# Patient Record
Sex: FEMALE | Race: WHITE | NOT HISPANIC OR LATINO | Employment: UNEMPLOYED | URBAN - METROPOLITAN AREA
[De-identification: names, ages, dates, MRNs, and addresses within clinical notes are randomized per-mention and may not be internally consistent; named-entity substitution may affect disease eponyms.]

---

## 2017-02-01 ENCOUNTER — OFFICE VISIT (OUTPATIENT)
Dept: FAMILY MEDICINE CLINIC | Facility: CLINIC | Age: 77
End: 2017-02-01

## 2017-02-01 VITALS
HEART RATE: 87 BPM | OXYGEN SATURATION: 99 % | SYSTOLIC BLOOD PRESSURE: 130 MMHG | DIASTOLIC BLOOD PRESSURE: 60 MMHG | HEIGHT: 65 IN | WEIGHT: 245 LBS | BODY MASS INDEX: 40.82 KG/M2

## 2017-02-01 DIAGNOSIS — N64.89 BREAST HEMATOMA: Primary | ICD-10-CM

## 2017-02-01 PROCEDURE — 99213 OFFICE O/P EST LOW 20 MIN: CPT | Performed by: FAMILY MEDICINE

## 2017-02-01 RX ORDER — ATORVASTATIN CALCIUM 10 MG/1
10 TABLET, FILM COATED ORAL DAILY
COMMUNITY

## 2017-02-01 RX ORDER — FEBUXOSTAT 80 MG/1
40 TABLET, FILM COATED ORAL DAILY
COMMUNITY

## 2017-02-01 RX ORDER — CARVEDILOL 3.12 MG/1
3.12 TABLET ORAL DAILY
COMMUNITY
End: 2017-05-04 | Stop reason: ALTCHOICE
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
## 2017-02-17 ENCOUNTER — OFFICE VISIT (OUTPATIENT)
Dept: MAMMOGRAPHY | Facility: CLINIC | Age: 77
End: 2017-02-17

## 2017-02-17 VITALS
TEMPERATURE: 97.5 F | HEART RATE: 64 BPM | WEIGHT: 241 LBS | SYSTOLIC BLOOD PRESSURE: 128 MMHG | BODY MASS INDEX: 38.73 KG/M2 | OXYGEN SATURATION: 97 % | DIASTOLIC BLOOD PRESSURE: 58 MMHG | HEIGHT: 66 IN

## 2017-02-17 DIAGNOSIS — N63.0 LUMP OR MASS IN BREAST: Primary | ICD-10-CM

## 2017-02-17 PROCEDURE — 99204 OFFICE O/P NEW MOD 45 MIN: CPT | Performed by: SURGERY
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
## 2017-04-24 DIAGNOSIS — Z79.899 HIGH RISK MEDICATION USE: ICD-10-CM

## 2017-04-24 DIAGNOSIS — R53.83 NO ENERGY: ICD-10-CM

## 2017-04-24 DIAGNOSIS — E11.9 DIABETES MELLITUS, STABLE (HCC): ICD-10-CM

## 2017-04-24 DIAGNOSIS — E78.2 MIXED HYPERLIPIDEMIA: Primary | ICD-10-CM

## 2017-04-27 LAB
ALBUMIN SERPL-MCNC: 4.3 G/DL (ref 3.5–5.2)
ALBUMIN/GLOB SERPL: 1.7 G/DL
ALP SERPL-CCNC: 84 U/L (ref 39–117)
ALT SERPL-CCNC: 13 U/L (ref 1–33)
AST SERPL-CCNC: 14 U/L (ref 1–32)
BILIRUB SERPL-MCNC: 0.5 MG/DL (ref 0.1–1.2)
BUN SERPL-MCNC: 88 MG/DL (ref 8–23)
BUN/CREAT SERPL: 47.1 (ref 7–25)
CALCIUM SERPL-MCNC: 9.4 MG/DL (ref 8.6–10.5)
CHLORIDE SERPL-SCNC: 100 MMOL/L (ref 98–107)
CHOLEST SERPL-MCNC: 123 MG/DL (ref 0–200)
CO2 SERPL-SCNC: 26.8 MMOL/L (ref 22–29)
CREAT SERPL-MCNC: 1.87 MG/DL (ref 0.57–1)
ERYTHROCYTE [DISTWIDTH] IN BLOOD BY AUTOMATED COUNT: 16.3 % (ref 11.7–13)
GLOBULIN SER CALC-MCNC: 2.6 GM/DL
GLUCOSE SERPL-MCNC: 126 MG/DL (ref 65–99)
HCT VFR BLD AUTO: 31.7 % (ref 35.6–45.5)
HDLC SERPL-MCNC: 36 MG/DL (ref 40–60)
HGB BLD-MCNC: 9.7 G/DL (ref 11.9–15.5)
LDLC SERPL CALC-MCNC: 57 MG/DL (ref 0–100)
LDLC/HDLC SERPL: 1.57 {RATIO}
MCH RBC QN AUTO: 27.3 PG (ref 26.9–32)
MCHC RBC AUTO-ENTMCNC: 30.6 G/DL (ref 32.4–36.3)
MCV RBC AUTO: 89.3 FL (ref 80.5–98.2)
PLATELET # BLD AUTO: 166 10*3/MM3 (ref 140–500)
POTASSIUM SERPL-SCNC: 4.6 MMOL/L (ref 3.5–5.2)
PROT SERPL-MCNC: 6.9 G/DL (ref 6–8.5)
RBC # BLD AUTO: 3.55 10*6/MM3 (ref 3.9–5.2)
SODIUM SERPL-SCNC: 142 MMOL/L (ref 136–145)
TRIGL SERPL-MCNC: 152 MG/DL (ref 0–150)
VLDLC SERPL CALC-MCNC: 30.4 MG/DL (ref 5–40)
WBC # BLD AUTO: 8.93 10*3/MM3 (ref 4.5–10.7)

## 2017-05-01 DIAGNOSIS — D50.9 IRON DEFICIENCY ANEMIA, UNSPECIFIED: Primary | ICD-10-CM

## 2017-05-01 LAB
BASOPHILS # BLD AUTO: 0.03 10*3/MM3 (ref 0–0.2)
BASOPHILS NFR BLD AUTO: 0.4 % (ref 0–1.5)
EOSINOPHIL # BLD AUTO: 0.31 10*3/MM3 (ref 0–0.7)
EOSINOPHIL NFR BLD AUTO: 3.8 % (ref 0.3–6.2)
ERYTHROCYTE [DISTWIDTH] IN BLOOD BY AUTOMATED COUNT: 16.4 % (ref 11.7–13)
FOLATE SERPL-MCNC: >20 NG/ML (ref 4.78–24.2)
HCT VFR BLD AUTO: 31 % (ref 35.6–45.5)
HGB BLD-MCNC: 9.4 G/DL (ref 11.9–15.5)
IMM GRANULOCYTES # BLD: 0.04 10*3/MM3 (ref 0–0.03)
IMM GRANULOCYTES NFR BLD: 0.5 % (ref 0–0.5)
IRON SATN MFR SERPL: 11 % (ref 20–50)
IRON SERPL-MCNC: 39 MCG/DL (ref 37–145)
LYMPHOCYTES # BLD AUTO: 1.78 10*3/MM3 (ref 0.9–4.8)
LYMPHOCYTES NFR BLD AUTO: 21.8 % (ref 19.6–45.3)
MCH RBC QN AUTO: 26.7 PG (ref 26.9–32)
MCHC RBC AUTO-ENTMCNC: 30.3 G/DL (ref 32.4–36.3)
MCV RBC AUTO: 88.1 FL (ref 80.5–98.2)
MONOCYTES # BLD AUTO: 0.51 10*3/MM3 (ref 0.2–1.2)
MONOCYTES NFR BLD AUTO: 6.3 % (ref 5–12)
NEUTROPHILS # BLD AUTO: 5.49 10*3/MM3 (ref 1.9–8.1)
NEUTROPHILS NFR BLD AUTO: 67.2 % (ref 42.7–76)
PLATELET # BLD AUTO: 142 10*3/MM3 (ref 140–500)
RBC # BLD AUTO: 3.52 10*6/MM3 (ref 3.9–5.2)
TIBC SERPL-MCNC: 355 MCG/DL
UIBC SERPL-MCNC: 316 MCG/DL
VIT B12 SERPL-MCNC: 426 PG/ML (ref 211–946)
WBC # BLD AUTO: 8.16 10*3/MM3 (ref 4.5–10.7)

## 2017-05-03 ENCOUNTER — CLINICAL SUPPORT (OUTPATIENT)
Dept: FAMILY MEDICINE CLINIC | Facility: CLINIC | Age: 77
End: 2017-05-03

## 2017-05-03 DIAGNOSIS — Z12.11 COLON CANCER SCREENING: Primary | ICD-10-CM

## 2017-05-03 LAB — METHYLMALONATE SERPL-SCNC: 686 NMOL/L (ref 0–378)

## 2017-05-03 PROCEDURE — 82274 ASSAY TEST FOR BLOOD FECAL: CPT | Performed by: FAMILY MEDICINE

## 2017-05-04 ENCOUNTER — OFFICE VISIT (OUTPATIENT)
Dept: FAMILY MEDICINE CLINIC | Facility: CLINIC | Age: 77
End: 2017-05-04

## 2017-05-04 VITALS
DIASTOLIC BLOOD PRESSURE: 48 MMHG | OXYGEN SATURATION: 97 % | WEIGHT: 240 LBS | HEART RATE: 84 BPM | RESPIRATION RATE: 16 BRPM | SYSTOLIC BLOOD PRESSURE: 124 MMHG | HEIGHT: 65 IN | BODY MASS INDEX: 39.99 KG/M2

## 2017-05-04 DIAGNOSIS — D50.8 OTHER IRON DEFICIENCY ANEMIA: Primary | ICD-10-CM

## 2017-05-04 DIAGNOSIS — K92.1: ICD-10-CM

## 2017-05-04 LAB — HEMOCCULT STL QL IA: POSITIVE

## 2017-05-04 PROCEDURE — 99214 OFFICE O/P EST MOD 30 MIN: CPT | Performed by: FAMILY MEDICINE

## 2017-05-04 RX ORDER — PNV NO.95/FERROUS FUM/FOLIC AC 28MG-0.8MG
1 TABLET ORAL DAILY
COMMUNITY

## 2017-05-04 RX ORDER — LANOLIN ALCOHOL/MO/W.PET/CERES
1000 CREAM (GRAM) TOPICAL DAILY
COMMUNITY

## 2017-05-04 RX ORDER — NITROGLYCERIN 0.4 MG/1
0.4 TABLET SUBLINGUAL AS NEEDED
Refills: 5 | COMMUNITY
Start: 2017-03-15

## 2017-05-05 PROBLEM — D64.9 ANEMIA: Status: ACTIVE | Noted: 2017-05-05

## 2017-05-05 PROBLEM — I49.5 SICK SINUS SYNDROME (HCC): Status: ACTIVE | Noted: 2017-05-05

## 2017-05-05 PROBLEM — I35.0 AORTIC VALVE STENOSIS: Status: ACTIVE | Noted: 2017-05-05

## 2017-05-05 PROBLEM — Z95.0 PRESENCE OF CARDIAC PACEMAKER: Status: ACTIVE | Noted: 2017-05-05

## 2017-05-05 PROBLEM — I25.10 CHRONIC CORONARY ARTERY DISEASE: Status: ACTIVE | Noted: 2017-05-05

## 2017-05-05 PROBLEM — I10 BENIGN ESSENTIAL HYPERTENSION: Status: ACTIVE | Noted: 2017-05-05

## 2017-05-08 DIAGNOSIS — K62.5 RECTAL BLEEDING: Primary | ICD-10-CM

## 2017-05-11 ENCOUNTER — TELEPHONE (OUTPATIENT)
Dept: FAMILY MEDICINE CLINIC | Facility: CLINIC | Age: 77
End: 2017-05-11

## 2017-05-18 ENCOUNTER — TELEPHONE (OUTPATIENT)
Dept: FAMILY MEDICINE CLINIC | Facility: CLINIC | Age: 77
End: 2017-05-18

## 2017-06-06 ENCOUNTER — OFFICE (OUTPATIENT)
Dept: URBAN - METROPOLITAN AREA CLINIC 75 | Facility: CLINIC | Age: 77
End: 2017-06-06
Payer: MEDICARE

## 2017-06-06 VITALS
WEIGHT: 243 LBS | HEIGHT: 65 IN | HEIGHT: 65 IN | HEIGHT: 65 IN | SYSTOLIC BLOOD PRESSURE: 126 MMHG | DIASTOLIC BLOOD PRESSURE: 64 MMHG | WEIGHT: 243 LBS | SYSTOLIC BLOOD PRESSURE: 126 MMHG | DIASTOLIC BLOOD PRESSURE: 64 MMHG | WEIGHT: 243 LBS | SYSTOLIC BLOOD PRESSURE: 126 MMHG | DIASTOLIC BLOOD PRESSURE: 64 MMHG

## 2017-06-06 DIAGNOSIS — D50.9 IRON DEFICIENCY ANEMIA, UNSPECIFIED: ICD-10-CM

## 2017-06-06 DIAGNOSIS — Z86.010 PERSONAL HISTORY OF COLONIC POLYPS: ICD-10-CM

## 2017-06-06 PROCEDURE — 99202 OFFICE O/P NEW SF 15 MIN: CPT

## 2017-06-06 PROCEDURE — 99212 OFFICE O/P EST SF 10 MIN: CPT

## 2017-06-13 ENCOUNTER — OFFICE (OUTPATIENT)
Dept: URBAN - METROPOLITAN AREA CLINIC 75 | Facility: CLINIC | Age: 77
End: 2017-06-13

## 2017-06-13 DIAGNOSIS — R19.5 OTHER FECAL ABNORMALITIES: ICD-10-CM

## 2017-06-13 DIAGNOSIS — T18.2XXA FOREIGN BODY IN STOMACH, INITIAL ENCOUNTER: ICD-10-CM

## 2017-06-13 DIAGNOSIS — Z98.890 OTHER SPECIFIED POSTPROCEDURAL STATES: ICD-10-CM

## 2017-06-13 DIAGNOSIS — D64.9 ANEMIA, UNSPECIFIED: ICD-10-CM

## 2017-06-13 PROCEDURE — 91110 GI TRC IMG INTRAL ESOPH-ILE: CPT

## 2021-06-09 ENCOUNTER — OUTSIDE FACILITY SERVICE (OUTPATIENT)
Dept: FAMILY MEDICINE CLINIC | Age: 81
End: 2021-06-09

## 2021-06-09 PROCEDURE — G0439 PPPS, SUBSEQ VISIT: HCPCS | Performed by: FAMILY MEDICINE

## 2021-06-14 NOTE — H&P
Yola Dorman A  1940   Domiciliary, Rest Home (eg, Boarding Home), or correction Care Services  Visit Date: Wed, Jun 9, 2021 11:38 am  Provider: Rosalind Blake MD   Location: [facility]    Subjective:    CC: Ms. Dorman is a 80 year old female.  This is a follow-up visit.  diabetes follow upMWE    HPI:     Yola has diabetes and  she is doing well on victoza,  BS are running <120.  She is working on low carb diet and trying to snack less. Her PN feet is stable and well controlled. She sees optometry for eye exams and retinal injections, she has    macular degeneration    Her gout is well controlled with febuxostat    Chronic afib and sees cardiology and is stable on eliquis and metoprolol, rate controlled, she is stable with her pacemaker    Labs 5/20:  hgb 8.8, HCT 27.9-she sees Dr Burnette and is managed by him      Ms. Dorman is here for a Medicare wellness visit.  The required HRA questions are integrated within this visit note. Family medical history and individual medical/surgical history were reviewed and updated.  A current height, weight, BMI, blood pressure, and pulse were recorded in the vitals section of the note and have been reviewed. Patient's medications, including supplements, were recorded in the chart and reviewed.  Current providers and suppliers were reviewed and updated.      Self-Assessment of Health: She rates her health as good. She rates her confidence of being able to control/manage most of her health problems as somewhat confident. Her physical/emotional health has limited her social activites not at all.  A review of possible cognitive impairment was performed and the following was noted: she is not having trouble driving;  memory changes are noted;  she is not having trouble with her finances A review of functional ability and level of safety was performed and the following was noted: Bathing ( performs independently ); Dressing: ( Performs with  assistance ); Eating: ( performs independently ); Toilet use: ( performs independently ); Transferring: ( performs independently ); Urine and Bowel continence: ( Abnormal ) Falls Risk: Has not had any falls or only one fall without injury in the past year.  She denies having trouble hearing the TV/radio when others do not and having to strain to hear or understand conversations.  Concerning home safety, she reports that at home she DOES have adequate lighting, a skid resistant shower/tub, grab bars in the bath, handrails on stairs, functioning smoke alarms and absence of throw rugs.      Immunization Status: Up to date; Physical Activity: She exercises but less than 20 minutes 3 days per week; Type of diet patient normally eats is described as well-balanced with fruits and vegetables Tobacco: She has never smoked.  Preventative Health updated today She is sleeping well, has some mild bowel incontinence.      PHQ-9 Depression Screening: Completed form scanned and in chart; Total Score 0   ROS:   CONSTITUTIONAL:  Negative for chills, fatigue, fever, and weight change.    EYES:  Negative for blurred vision.    CARDIOVASCULAR:  Positive for pedal edema ( mild ).   Negative for chest pain, dizziness, orthopnea or paroxysmal nocturnal dyspnea.    RESPIRATORY:  Negative for dyspnea.    GASTROINTESTINAL:  Negative for abdominal pain, constipation, diarrhea, hematochezia, melena, nausea and vomiting.    GENITOURINARY:  Negative for dysuria and frequent urination.    INTEGUMENTARY/BREAST:  Negative for rash.    NEUROLOGICAL:  Negative for dizziness, headaches, paresthesias, and weakness.    PSYCHIATRIC:  Negative for anxiety, depression and sleep disturbance.      Past Medical History / Family History / Social History:     Last Reviewed on 6/09/2021 11:41 AM by Rosalind Blake  Past Medical History: D64.9   Anemia, unspecified     N18.3   Chronic kidney disease, stage 3 (moderate)     H35.3230   Exudative age-related  macular degeneration, bilateral, stage unspecified     I25.10   Atherosclerotic heart disease of native coronary artery without angina pectoris     G25.0   Essential tremor     I48.91   Unspecified atrial fibrillation     E11.59   Type 2 diabetes mellitus with other circulatory complications     M10.00   Idiopathic gout, unspecified site     I73.9   Peripheral vascular disease, unspecified     E78.00   Pure hypercholesterolemia, unspecified           PAST MEDICAL HISTORY       CURRENT MEDICAL PROVIDERS:  Cardiologist: Jose Brice  Nephrologist: Dr Bennett  Oncologist: ROSALIE  Ophthalmologist: Dr Crooks, Dr Briones  Podiatry-[facility]  Dentist-unknown     Surgical History:     Coronary Artery Bypass Graft: 4-V 1990's;   Partial foot amputation, diabetic foot wound;     Current Medications: MED RECC SHEET REVIEWED AND SIGNED ON CHART  Last Reviewed on 3/10/2021 11:21 AM by Rosalind Blake  Lanpoornimaus SoloSTAR 100units/1ml Injection [Take 65 units daily]  blood sugar diagnostic strips  [Use strips as directed to test blood sugar daily DX. E11.51]  OneTouch Delica Plus Lancet 33 gauge [USE 1  TO CHECK GLUCOSE ONCE DAILY FOR  FLUCTUATING  BLOOD  SUGARS]    Objective:    Vitals:     Current: 6/9/2021 11:45:19 AM  Wt: 193 lbsT: 96.8 F;  BP: 138/58 mm Hg;  P: 68 bpm;  R: 18 bpmO2 Sat: 99 % (room air)    Exams:   PHYSICAL EXAM:   GENERAL: vital signs recorded - well developed, well nourished,  moderately obese;  well groomed;  no apparent distress;   EYES: nonicteric; PERRL, EOMI   E/N/T: OROPHARYNX:  normal mucosa, dentition, gingiva, and posterior pharynx;   NECK:  supple, full ROM; no thyromegaly; no carotid bruits;   RESPIRATORY: CTA B WITHOUT WHEEZING/RALES OR RHONCHI, NORMAL RESP. EFFORT   CARDIOVASCULAR: normal rate; rhythm is regular;  a systolic murmur is noted: it is grade 3/6 and Character soft;   GASTROINTESTINAL: soft, NT/ND;   BREAST/INTEGUMENT: BREASTS: breast exam is normal without masses, skin changes, or nipple  discharge;   MUSCULOSKELETAL: gait: slowed and wide-based;  no pedal edema;   NEUROLOGIC: mental status: oriented to person, place, and time;  GROSSLY INTACT   PSYCHIATRIC:  appropriate affect and demeanor; normal speech pattern; grossly normal memory;   Left foot exam  Protective sensation using Monofilament test: Decreased, with estimated force of 2 grams applied.  Vascular deficit noted in the dorsal pedal artery  Skin is intact without sores or ulcers  Right foot exam  Protective sensation using Monofilament test: Decreased, with estimated force of 2 grams applied.  Vascular deficit noted in the dorsal pedal artery  Skin is intact without sores or ulcers     Assessment:     Z00.00   Encounter for general adult medical examination without abnormal findings     D64.9   Anemia, unspecified     N18.3   Chronic kidney disease, stage 3 (moderate)     I25.10   Atherosclerotic heart disease of native coronary artery without angina pectoris     G25.0   Essential tremor     I48.91   Unspecified atrial fibrillation     E11.59   Type 2 diabetes mellitus with other circulatory complications     M10.00   Idiopathic gout, unspecified site     I73.9   Peripheral vascular disease, unspecified     E78.00   Pure hypercholesterolemia, unspecified     Z13.31   Encounter for screening for depression     I65.23   Occlusion and stenosis of bilateral carotid arteries     H35.3230   Exudative age-related macular degeneration, bilateral, stage unspecified     G60.0   Hereditary motor and sensory neuropathy       ORDERS:     Procedures Ordered:       Annual wellness visit, includes a PPPS, subsequent visit  (In-House)          Other Orders:     2028F  Foot examination performed (includes examination through visual inspection, sensory exam with monofilament, and pulse exam - report when any of the three components are completed) (DM)4  (In-House)            Depression screen negative  (In-House)          1101F  Pt screen for fall  risk; document no falls in past year or only 1 fall w/o injury in past year (EMILIA)  (In-House)              Plan:     Encounter for general adult medical examination without abnormal findingsMEDICARE WELLNESS EXAM-SHE IS DONE WITH HER MAMMOGRAM, DEXA, COLONOSCOPY, PAP AND PELVIC EXAM, UTD ON VACCINATIONS INCLUDING:  SHINGLES VACCINE/PREVNAR/ PNEUMOVAX/TD/COVID  AND FLU VACCINE, NO FALL RISK, NO MEMORY ISSUES, NO SIGNS/SYMPTOMS OF DEPRESSION, SHE IS NO LONGER ABLE TO DRIVE AND PERFORM ADL'S/FINANCES INDEPENDENTLY, HEARING IS ADEQUATE-PT DEFERS SCREENING TEST, SHE HAS A LIVING WILL AND A PREV SERVICES HANDOUT AND SAFETY HANDOUT WERE GIVEN TO HER.  CURRENT DOCTOR LIST UPDATED.  RECOMMEND YEARLY EYE EXAMS       Orders:       Annual wellness visit, includes a PPPS, subsequent visit  (In-House)          Anemia, unspecifiedshe is on iron infusions and sees Dr Burnette, her colonoscopy 1 year ago was OK    Chronic kidney disease, stage 3 (moderate)stable, has neurology f/u with Dr Bennett    Atherosclerotic heart disease of native coronary artery without angina pectorisno acute or new symptoms    Essential tremorstable, worse at night, overall doing fine    Unspecified atrial fibrillationNSRstable on eliquis and metoprolol,  she is stable with her pacemaker    Type 2 diabetes mellitus with other circulatory complicationswell controlled!    Idiopathic gout, unspecified sitestable on febuxostat    Peripheral vascular disease, unspecifiedstable, foot ulcer resolved    Pure hypercholesterolemia, unspecifiedstable on atorvastatin    Encounter for screening for depression  MIPS PHQ-9 Depression Screening: Completed form scanned and in chart; Total Score 0; Negative Depression Screen       Orders:       Depression screen negative  (In-House)          1101F  Pt screen for fall risk; document no falls in past year or only 1 fall w/o injury in past year (EMILIA)  (In-House)          Occlusion and stenosis of bilateral carotid  [id number]% BLOCKAGE ON CAROTID US B    Exudative age-related macular degeneration, bilateral, stage unspecifiedYoanna has macular degeneration of her eyes, she gets monthly shots.    Hereditary motor and sensory neuropathychronic, stable, she see podiatry, wears surgical shoes due to h/o diabetic foot ulcer      Other Orders    2028F  Foot examination performed (includes examination through visual inspection, sensory exam with monofilament, and pulse exam - report when any of the three components are completed) (DM)4  (In-House)          Charge Capture:     Primary Diagnosis:   Z00.00  Encounter for general adult medical examination without abnormal findings       Orders:    [address]350  Domiciliary/rest home visit for E&M of estab pt, 3 components, mod-high severity; MD visit 40 min  (In-House)            Annual wellness visit, includes a PPPS, subsequent visit  (In-House)          D64.9  Anemia, unspecified   N18.3  Chronic kidney disease, stage 3 (moderate)   I25.10  Atherosclerotic heart disease of native coronary artery without angina pectoris   G25.0  Essential tremor   I48.91  Unspecified atrial fibrillation   E11.59  Type 2 diabetes mellitus with other circulatory complications   M10.00  Idiopathic gout, unspecified site   I73.9  Peripheral vascular disease, unspecified   E78.00  Pure hypercholesterolemia, unspecified   Z13.31  Encounter for screening for depression       Orders:      Depression screen negative  (In-House)          1101F  Pt screen for fall risk; document no falls in past year or only 1 fall w/o injury in past year (EMILIA)  (In-House)          I65.23  Occlusion and stenosis of bilateral carotid arteries   H35.3230  Exudative age-related macular degeneration, bilateral, stage unspecified   G60.0  Hereditary motor and sensory neuropathy     Other Orders:     2028F  Foot examination performed (includes examination through visual inspection, sensory exam with monofilament, and pulse exam -  report when any of the three components are completed) (DM)4  (In-House)

## 2021-08-27 RX ORDER — MELATONIN
Qty: 60 TABLET | Refills: 5 | Status: SHIPPED | OUTPATIENT
Start: 2021-08-27

## 2021-09-08 ENCOUNTER — OFFICE VISIT (OUTPATIENT)
Dept: FAMILY MEDICINE CLINIC | Age: 81
End: 2021-09-08

## 2021-09-08 DIAGNOSIS — M81.0 AGE RELATED OSTEOPOROSIS, UNSPECIFIED PATHOLOGICAL FRACTURE PRESENCE: ICD-10-CM

## 2021-09-08 DIAGNOSIS — H61.22 IMPACTED CERUMEN OF LEFT EAR: ICD-10-CM

## 2021-09-08 DIAGNOSIS — Z95.0 CARDIAC PACEMAKER IN SITU: ICD-10-CM

## 2021-09-08 DIAGNOSIS — Z86.79 HISTORY OF CARDIOMYOPATHY: ICD-10-CM

## 2021-09-08 DIAGNOSIS — D50.8 OTHER IRON DEFICIENCY ANEMIA: ICD-10-CM

## 2021-09-08 DIAGNOSIS — N18.4 STAGE 4 CHRONIC KIDNEY DISEASE (HCC): ICD-10-CM

## 2021-09-08 DIAGNOSIS — M10.9 GOUT, UNSPECIFIED CAUSE, UNSPECIFIED CHRONICITY, UNSPECIFIED SITE: ICD-10-CM

## 2021-09-08 DIAGNOSIS — I73.9 PERIPHERAL VASCULAR DISEASE (HCC): ICD-10-CM

## 2021-09-08 DIAGNOSIS — E11.59 TYPE 2 DIABETES MELLITUS WITH OTHER CIRCULATORY COMPLICATION, WITHOUT LONG-TERM CURRENT USE OF INSULIN (HCC): Primary | ICD-10-CM

## 2021-09-08 DIAGNOSIS — G47.33 OBSTRUCTIVE SLEEP APNEA: ICD-10-CM

## 2021-09-08 PROBLEM — I48.91 ATRIAL FIBRILLATION (HCC): Status: ACTIVE | Noted: 2019-05-08

## 2021-09-08 PROBLEM — I65.23 ATHEROSCLEROSIS OF BOTH CAROTID ARTERIES: Status: ACTIVE | Noted: 2019-12-02

## 2021-09-08 PROCEDURE — 69210 REMOVE IMPACTED EAR WAX UNI: CPT | Performed by: FAMILY MEDICINE

## 2021-09-08 NOTE — PROGRESS NOTES
[facility] VISIT [place]    Chief Complaint  DIABETES FOLLOW UP  Subjective          Yola Dorman presents to [facility]  History of Present Illness  I am seeing Yola today for her chronic diabetes.  She is doing extremely well on Victoza daily injections.  Her hemoglobin A1c was 5.7.  Her cholesterol is extremely well controlled on atorvastatin.  Cholesterol level was 124 on recent labs.  She sees Jose Brice for her history of sinus sick syndrome and atrial fibrillation-she is on eliquis and metoprolol.  She has chronic cardiomyopathy and has a pacemaker in place.   She has gained 7 #s in past week. She is scheduled for an echocardiogram and cardiac device check in February of this coming up year.  She is stable on medication.  Her blood pressure stable on losartan and metoprolol.  She is on Lasix 40 mg daily and was given an extra pill today.  She has chronic allergies and is doing well takes PRN cetirizine.  She is on vitamin D for vitamin D 3 deficiency.  And finally she is on B12  Review of Systems   Constitutional: Negative for chills, fatigue and fever.   HENT: Negative for ear pain, sinus pressure and sore throat.    Eyes: Negative for blurred vision and double vision.   Respiratory: Negative for cough, shortness of breath and wheezing.    Cardiovascular: Negative for chest pain and palpitations.   Gastrointestinal: Negative for abdominal pain, blood in stool, constipation, diarrhea, nausea and vomiting.   Skin: Negative for rash.   Neurological: Negative for dizziness and headache.   Psychiatric/Behavioral: Negative for depressed mood.        Objective   Vital Signs:  Reviewed on chart  Physical Exam  Vitals and nursing note reviewed.   Constitutional:       General: She is not in acute distress.     Appearance: Normal appearance. She is not ill-appearing, toxic-appearing or diaphoretic.   HENT:      Head: Normocephalic and atraumatic.      Right Ear:  Tympanic membrane, ear canal and external ear normal.      Left Ear: Tympanic membrane, ear canal and external ear normal. There is impacted cerumen.      Nose: No congestion or rhinorrhea.      Mouth/Throat:      Pharynx: Oropharynx is clear. No oropharyngeal exudate or posterior oropharyngeal erythema.   Eyes:      Extraocular Movements: Extraocular movements intact.      Conjunctiva/sclera: Conjunctivae normal.   Cardiovascular:      Rate and Rhythm: Normal rate and regular rhythm.      Heart sounds: Murmur heard.     Pulmonary:      Breath sounds: Normal breath sounds. No wheezing, rhonchi or rales.   Abdominal:      General: Abdomen is flat.      Palpations: Abdomen is soft.   Musculoskeletal:      Cervical back: Neck supple. No rigidity.   Lymphadenopathy:      Cervical: No cervical adenopathy.   Skin:     General: Skin is warm and dry.   Neurological:      Mental Status: She is alert and oriented to person, place, and time.   Psychiatric:         Mood and Affect: Mood normal.         Behavior: Behavior normal.        Result Review :   The following data was reviewed by: Rosalind Blake MD on 09/08/2021:                   Ear Cerumen Removal    Date/Time: 9/8/2021 11:02 AM  Performed by: Rosalind Blake MD  Authorized by: Rosalind Blake MD     Anesthesia:  Local Anesthetic: none  Location details: left ear  Patient tolerance: patient tolerated the procedure well with no immediate complications  Comments: Performed by Dr Blake  Procedure type: instrumentation   Sedation:  Patient sedated: no              Assessment and Plan    Diagnoses and all orders for this visit:    1. Type 2 diabetes mellitus with other circulatory complication, without long-term current use of insulin (CMS/[facility]) (Primary)  Comments:  Very well controlled with medication Victoza.  Continue current diabetic diet and yearly eye exams.  Recommend flu vaccine in October    2. Stage 4 chronic kidney disease  (CMS/[facility])  Comments:  She sees nephrology and is stable at this time    3. Other iron deficiency anemia  Comments:  Stable    4. Cardiac pacemaker in situ    5. Peripheral vascular disease (CMS/[facility])    6. History of cardiomyopathy  Comments:  Increase Lasix to twice daily dosing with breakfast and lunch until her weight is back down to 191 and increase potassium as needed increase Lasix    7. Gout, unspecified cause, unspecified chronicity, unspecified site  Comments:  Stable on allopurinol    8. Obstructive sleep apnea  Comments:  Continue CPAP    9. Age related osteoporosis, unspecified pathological fracture presence    Other orders  -     Ear Cerumen Removal        Follow Up 3 months

## 2021-09-28 RX ORDER — BLOOD SUGAR DIAGNOSTIC
STRIP MISCELLANEOUS
Qty: 100 EACH | Refills: 3 | Status: SHIPPED | OUTPATIENT
Start: 2021-09-28 | End: 2021-11-29

## 2021-10-22 ENCOUNTER — TELEPHONE (OUTPATIENT)
Dept: FAMILY MEDICINE CLINIC | Age: 81
End: 2021-10-22

## 2021-10-22 NOTE — TELEPHONE ENCOUNTER
HUB TO READ    Anna called asking for us to get a official statement from dr crawley and stamped due to pt loosing her social sec number. Best call back number to call nurse back at is [phone]

## 2021-10-29 RX ORDER — LIRAGLUTIDE 6 MG/ML
INJECTION SUBCUTANEOUS
Qty: 6 ML | Refills: 5 | Status: SHIPPED | OUTPATIENT
Start: 2021-10-29

## 2021-11-23 RX ORDER — PEN NEEDLE, DIABETIC 30 GX3/16"
1 NEEDLE, DISPOSABLE MISCELLANEOUS DAILY
Qty: 100 EACH | Refills: 3 | Status: SHIPPED | OUTPATIENT
Start: 2021-11-23

## 2021-11-23 RX ORDER — PEN NEEDLE, DIABETIC 30 GX3/16"
1 NEEDLE, DISPOSABLE MISCELLANEOUS DAILY
COMMUNITY
End: 2021-11-23 | Stop reason: SDUPTHER

## 2021-11-29 ENCOUNTER — TELEPHONE (OUTPATIENT)
Dept: FAMILY MEDICINE CLINIC | Age: 81
End: 2021-11-29

## 2021-11-29 RX ORDER — BLOOD SUGAR DIAGNOSTIC
STRIP MISCELLANEOUS
Qty: 50 EACH | Refills: 0 | Status: SHIPPED | OUTPATIENT
Start: 2021-11-29 | End: 2021-11-30 | Stop reason: SDUPTHER

## 2021-11-29 NOTE — TELEPHONE ENCOUNTER
Caller: HENNA    Relationship: [facility]    Best call back number: [phone]    Who are you requesting to speak with (clinical staff, provider,  specific staff member): DEANNA PENA    What was the call regarding: HENNA WITH [employer] HAS CALLED TO UPDATE DEANNA PENA THAT THE PATIENT'S REFILL FOR TESTING STRIPS COULD NOT BE FILLED DUE TO NO DIAGNOSTIC CODE.     Do you require a callback: IF NEEDED.

## 2021-11-30 RX ORDER — BLOOD SUGAR DIAGNOSTIC
STRIP MISCELLANEOUS
Qty: 100 EACH | Refills: 5 | Status: SHIPPED | OUTPATIENT
Start: 2021-11-30

## 2021-12-08 ENCOUNTER — NURSING HOME (OUTPATIENT)
Dept: FAMILY MEDICINE CLINIC | Age: 81
End: 2021-12-08

## 2021-12-08 DIAGNOSIS — N18.4 STAGE 4 CHRONIC KIDNEY DISEASE (HCC): ICD-10-CM

## 2021-12-08 DIAGNOSIS — M10.9 GOUT, UNSPECIFIED CAUSE, UNSPECIFIED CHRONICITY, UNSPECIFIED SITE: ICD-10-CM

## 2021-12-08 DIAGNOSIS — Z95.0 PRESENCE OF CARDIAC PACEMAKER: ICD-10-CM

## 2021-12-08 DIAGNOSIS — E78.00 PURE HYPERCHOLESTEROLEMIA: ICD-10-CM

## 2021-12-08 DIAGNOSIS — I48.0 PAROXYSMAL ATRIAL FIBRILLATION (HCC): ICD-10-CM

## 2021-12-08 DIAGNOSIS — R53.83 FATIGUE, UNSPECIFIED TYPE: ICD-10-CM

## 2021-12-08 DIAGNOSIS — I10 BENIGN ESSENTIAL HYPERTENSION: Primary | ICD-10-CM

## 2021-12-08 DIAGNOSIS — E11.59 TYPE 2 DIABETES MELLITUS WITH OTHER CIRCULATORY COMPLICATION, WITHOUT LONG-TERM CURRENT USE OF INSULIN (HCC): ICD-10-CM

## 2021-12-08 DIAGNOSIS — F32.4 MAJOR DEPRESSIVE DISORDER WITH SINGLE EPISODE, IN PARTIAL REMISSION (HCC): ICD-10-CM

## 2021-12-08 DIAGNOSIS — G47.33 OBSTRUCTIVE SLEEP APNEA: ICD-10-CM

## 2021-12-08 DIAGNOSIS — D50.8 OTHER IRON DEFICIENCY ANEMIA: ICD-10-CM

## 2021-12-08 NOTE — PROGRESS NOTES
[facility] VISIT [place]    Chief Complaint: fatigue with anemia      Subjective          Yola Dorman presents to [facility]  History of Present Illness  Long term iron deficiency anemia with recent hgb at 7, which is causing her fatigue, she is seeing Dr Burnette and she is s/p 2 recent iron infusions and she feels a little better.  She will get a B12 injection at next appt.  She denies N/V/D/C/melena/BRBPR/hematuria/bruising    BM are loose most days    BP is elevated today but her BP in general have been good on losartan and metoprolol    She is sleeping well at night    She thinks she had mild depression but she states she is feeling better today.  She denies sadness.Occasional crying spells.     She is on ASA, atorvastatin, eliquis, vit c, febuxostat (gout), fish oil, lasix, losartan, metoprolol, victoza, vit B/D  She has a pacemaker    Labs show hgba1c 5.8%, Cr 1.76 (sees Dr Bennett), GFR 27, hgb 7.5, hct 24.4cholesterol 96, TG 75    Review of Systems   Constitutional: Positive for fatigue. Negative for chills and fever.   HENT: Negative for ear pain, sinus pressure and sore throat.    Eyes: Negative for blurred vision and double vision.   Respiratory: Positive for shortness of breath. Negative for cough and wheezing.    Cardiovascular: Negative for chest pain, palpitations and leg swelling.   Gastrointestinal: Negative for abdominal pain, blood in stool, constipation, diarrhea, nausea and vomiting.   Musculoskeletal: Positive for bursitis.   Skin: Negative for rash.   Neurological: Negative for dizziness and headache.   Psychiatric/Behavioral: Positive for depressed mood. Negative for sleep disturbance and suicidal ideas.        Objective   Vital Signs:   VS reviewed and signed on chart  Physical Exam  Vitals and nursing note reviewed.   Constitutional:       General: She is not in acute distress.     Appearance: Normal appearance. She is not ill-appearing,  toxic-appearing or diaphoretic.   HENT:      Head: Normocephalic and atraumatic.      Nose: No congestion or rhinorrhea.      Mouth/Throat:      Pharynx: Oropharynx is clear. No oropharyngeal exudate or posterior oropharyngeal erythema.   Eyes:      Extraocular Movements: Extraocular movements intact.      Conjunctiva/sclera: Conjunctivae normal.   Cardiovascular:      Rate and Rhythm: Normal rate and regular rhythm.      Heart sounds: Murmur heard.    Systolic murmur is present with a grade of 4/6.      Pulmonary:      Breath sounds: Normal breath sounds. No wheezing, rhonchi or rales.   Abdominal:      General: Abdomen is flat.      Palpations: Abdomen is soft.   Musculoskeletal:      Cervical back: Neck supple. No rigidity.      Right lower le+ Edema present.      Left lower le+ Edema present.   Lymphadenopathy:      Cervical: No cervical adenopathy.   Skin:     General: Skin is warm and dry.   Neurological:      Mental Status: She is alert and oriented to person, place, and time.      Cranial Nerves: Cranial nerves are intact.      Gait: Gait abnormal.   Psychiatric:         Mood and Affect: Mood normal.         Behavior: Behavior normal.        Result Review :   The following data was reviewed by: Rosalind Blake MD on 2021:                          Assessment and Plan    Diagnoses and all orders for this visit:    1. Benign essential hypertension (Primary)  Comments:  overall stable, elevated today, will cont current meds and BP checks    2. Other iron deficiency anemia  Comments:  seeing Dr Burnette, s/p iron infustions    3. Pure hypercholesterolemia  Comments:  chol is low, decrease lipitor to 5 mg nightly    4. Stage 4 chronic kidney disease (HCC)  Comments:  recently saw DR Bennett, mild worsening of renal function to borderline stage 4, no new orders    5. Presence of cardiac pacemaker    6. Obstructive sleep apnea  Comments:  stable with cpap    7. Gout, unspecified cause, unspecified  chronicity, unspecified site  Comments:  stable on uloric    8. Paroxysmal atrial fibrillation (HCC)  Comments:  stable on BB and eliquis and baby aspirin, no signs of GI bleeding    9. Major depressive disorder with single episode, in partial remission (HCC)  Comments:  she defers medication at this time, she is starting to feel better    10. Type 2 diabetes mellitus with other circulatory complication, without long-term current use of insulin (HCC)  Comments:  stable on victoza, hgba1c is low-will decrease victoza to 0.6 mg daily    11. Fatigue, unspecified type  Comments:  due to the low iron/hgb.  s/p iron transfusions        Follow Up   No follow-ups on file.

## 2021-12-22 ENCOUNTER — NURSING HOME (OUTPATIENT)
Dept: FAMILY MEDICINE CLINIC | Age: 81
End: 2021-12-22

## 2021-12-22 DIAGNOSIS — E11.59 TYPE 2 DIABETES MELLITUS WITH OTHER CIRCULATORY COMPLICATION, WITHOUT LONG-TERM CURRENT USE OF INSULIN (HCC): Primary | ICD-10-CM

## 2021-12-22 RX ORDER — ASPIRIN 81 MG/1
TABLET, COATED ORAL
Qty: 30 TABLET | Refills: 6 | Status: SHIPPED | OUTPATIENT
Start: 2021-12-22

## 2021-12-22 NOTE — PROGRESS NOTES
DOMICILARY VISIT [facility]  SRIKANTH REFUSED TO BE SEEN.  WANTED A CHART REVIEW INSTEAD-her victoza was decreased due to low BS and her BS diary reviewed today is very good.  -130 on average with victoza 0.6 mg daily    Review of Systems     Objective   Vital Signs:   There were no vitals taken for this visit.    Physical Exam   Result Review :   The following data was reviewed by: Rosalind Blake MD on 12/22/2021:                          Assessment and Plan    There are no diagnoses linked to this encounter.    Follow Up   No follow-ups on file.

## 2022-01-06 RX ORDER — LANCETS 33 GAUGE
EACH MISCELLANEOUS
Qty: 100 EACH | Refills: 0 | Status: SHIPPED | OUTPATIENT
Start: 2022-01-06 | End: 2022-06-01

## 2022-03-09 ENCOUNTER — NURSING HOME (OUTPATIENT)
Dept: FAMILY MEDICINE CLINIC | Age: 82
End: 2022-03-09

## 2022-03-09 DIAGNOSIS — E08.621 DIABETIC ULCER OF LEFT FOOT ASSOCIATED WITH DIABETES MELLITUS DUE TO UNDERLYING CONDITION, LIMITED TO BREAKDOWN OF SKIN, UNSPECIFIED PART OF FOOT: ICD-10-CM

## 2022-03-09 DIAGNOSIS — L97.521 DIABETIC ULCER OF LEFT FOOT ASSOCIATED WITH DIABETES MELLITUS DUE TO UNDERLYING CONDITION, LIMITED TO BREAKDOWN OF SKIN, UNSPECIFIED PART OF FOOT: ICD-10-CM

## 2022-03-09 DIAGNOSIS — I25.10 CHRONIC CORONARY ARTERY DISEASE: ICD-10-CM

## 2022-03-09 DIAGNOSIS — E11.40 TYPE 2 DIABETES MELLITUS WITH DIABETIC NEUROPATHY, WITHOUT LONG-TERM CURRENT USE OF INSULIN: ICD-10-CM

## 2022-03-09 DIAGNOSIS — D50.8 IRON DEFICIENCY ANEMIA SECONDARY TO INADEQUATE DIETARY IRON INTAKE: ICD-10-CM

## 2022-03-09 DIAGNOSIS — Z95.0 CARDIAC PACEMAKER IN SITU: ICD-10-CM

## 2022-03-09 DIAGNOSIS — E78.2 MIXED HYPERLIPIDEMIA: ICD-10-CM

## 2022-03-09 DIAGNOSIS — Z86.79 HISTORY OF CARDIOMYOPATHY: ICD-10-CM

## 2022-03-09 DIAGNOSIS — H69.83 EUSTACHIAN TUBE DYSFUNCTION, BILATERAL: ICD-10-CM

## 2022-03-09 DIAGNOSIS — N18.4 STAGE 4 CHRONIC KIDNEY DISEASE: ICD-10-CM

## 2022-03-09 DIAGNOSIS — R42 DIZZINESS: ICD-10-CM

## 2022-03-09 DIAGNOSIS — J30.9 ALLERGIC SINUSITIS: ICD-10-CM

## 2022-03-09 DIAGNOSIS — I10 BENIGN ESSENTIAL HYPERTENSION: Primary | ICD-10-CM

## 2022-03-09 NOTE — PROGRESS NOTES
[facility] VISIT [place]    Chief Complaint  Feeling dizzy    Subjective          Yola Dorman presents to [facility]  History of Present Illness  She is feeling lightheaded, it has occurred for a long bessy but rare, and the past 1-2 weeks it has gotten worse, she feels like she cant tell what is up and down and if she is standing and touches something this helps right her feeling to be normal again.  No CP/ ear pain/palpitations.     Mild SOA with ambulation.  She experienced a bad cold in Jan and she still has sinus congestion and pain-wakes up in pain, making her cpap miserable to use. She has fatigue and headache now,She has a pacemaker.    In general she is sleeping well, eating ok, BM are normal    Long term iron deficiency anemia with recent hgb at 7, which is causing her fatigue, she is seeing Dr Burnette often for this and she is receiving iron infusions and she feels a little better.  She is also s/p B12 injection and is on B12 pills and vitamin D pills     BP is elevated today but her BP in general have been good on losartan and metoprolol     She is sleeping well at night     She thinks she had mild depression but she states she is feeling better today.  She denies sadness.Occasional crying spells.      She is on ASA, atorvastatin, eliquis, vit c, febuxostat (gout), fish oil, lasix, losartan, metoprolol, victoza, vit B/D                 Review of Systems   Constitutional: Positive for fatigue. Negative for chills and fever.   HENT: Positive for sinus pressure. Negative for ear pain and sore throat.    Eyes: Negative for blurred vision and double vision.   Respiratory: Negative for cough, shortness of breath and wheezing.    Cardiovascular: Negative for chest pain and palpitations.   Gastrointestinal: Negative for abdominal pain, blood in stool, constipation, diarrhea, nausea and vomiting.   Skin: Negative for rash.   Neurological: Positive for dizziness and  headache.   Psychiatric/Behavioral: Negative for sleep disturbance, suicidal ideas and depressed mood. The patient is not nervous/anxious.         Objective   Vital Signs:   There were no vitals taken for this visit.    Physical Exam  Vitals and nursing note reviewed.   Constitutional:       General: She is not in acute distress.     Appearance: Normal appearance. She is not ill-appearing, toxic-appearing or diaphoretic.   HENT:      Head: Normocephalic and atraumatic.      Right Ear: Ear canal and external ear normal. Tympanic membrane is retracted.      Left Ear: Ear canal and external ear normal. Tympanic membrane is retracted.      Nose: No congestion or rhinorrhea.      Right Turbinates: Swollen.      Left Turbinates: Swollen.      Right Sinus: No maxillary sinus tenderness or frontal sinus tenderness.      Left Sinus: No maxillary sinus tenderness or frontal sinus tenderness.      Mouth/Throat:      Mouth: Mucous membranes are moist.      Pharynx: Oropharynx is clear. No pharyngeal swelling, oropharyngeal exudate or posterior oropharyngeal erythema.      Comments: PND noted  Eyes:      Extraocular Movements: Extraocular movements intact.      Conjunctiva/sclera: Conjunctivae normal.      Pupils: Pupils are equal, round, and reactive to light.   Cardiovascular:      Rate and Rhythm: Normal rate and regular rhythm.      Pulses:           Dorsalis pedis pulses are 2+ on the right side and 2+ on the left side.      Heart sounds: Normal heart sounds.   Pulmonary:      Effort: Pulmonary effort is normal.      Breath sounds: Normal breath sounds. No wheezing, rhonchi or rales.   Abdominal:      General: Abdomen is flat.      Palpations: Abdomen is soft.   Musculoskeletal:      Cervical back: Neck supple. No rigidity.      Left foot: Deformity and bunion present.   Feet:      Right foot:      Protective Sensation: 7 sites tested. 0 sites sensed.      Skin integrity: Skin integrity normal. No ulcer or blister.       Toenail Condition: Right toenails are normal.      Left foot:      Protective Sensation: 7 sites tested. 0 sites sensed.      Skin integrity: Skin integrity normal. No ulcer or blister.      Toenail Condition: Left toenails are normal.      Comments:      Lymphadenopathy:      Cervical: No cervical adenopathy.   Skin:     General: Skin is warm and dry.   Neurological:      Mental Status: She is alert and oriented to person, place, and time.   Psychiatric:         Mood and Affect: Mood normal.         Behavior: Behavior normal.        Result Review :   The following data was reviewed by: Rosalind Blake MD on 03/09/2022:                          Assessment and Plan    Diagnoses and all orders for this visit:    1. Benign essential hypertension (Primary)  Comments:  stable and well controlled    2. Cardiac pacemaker in situ    3. Chronic coronary artery disease  Comments:  sees Jose Brice, stable on recent OV/pacemaker check and echo    4. Type 2 diabetes mellitus with diabetic neuropathy, without long-term current use of insulin (HCC)  Comments:  very well controlled on victoza, wt is stable, hgba1c was 6.2%    5. History of cardiomyopathy  Comments:  sees Jose Brice, stable on recent OV/pacemaker check and echo    6. Mixed hyperlipidemia  Comments:  stable on atorvastatin, tolerates meds well, chol 161,     7. Stage 4 chronic kidney disease (HCC)  Comments:  2/14/22 Cr 1.87, GFR 25-she sees Dr Bennett    8. Dizziness  Comments:  We will treat her for eustachian tube.  She is to let nursing know if she does not have 100% resolution    9. Diabetic ulcer of left foot associated with diabetes mellitus due to underlying condition, limited to breakdown of skin, unspecified part of foot ([facility])  Comments:  sees DR Dewitt, healing very slowly due to PVD and PN    10. Iron deficiency anemia secondary to inadequate dietary iron intake  Comments:  on B12 and iron infusions and sees Dr Burnette, last hgb 9.7, HCT  30.5.  Colonoscopy 1 yr ago-thinks it was normal    11. Eustachian tube dysfunction, bilateral  Comments:  Will treat with Flonase nasal spray nightly and prednisone 40 mg daily x3 days, she is to perform PT for the ears hold nose and swallow 10 times a day    12. Allergic sinusitis  Comments:  Will treat with Flonase nasal spray nightly and prednisone 40 mg daily x3 days        Follow Up   Return in about 3 months (around 6/9/2022), or if symptoms worsen or fail to improve, for Recheck.

## 2022-05-04 RX ORDER — THIAMINE HCL 100 MG
TABLET ORAL
Qty: 30 EACH | Refills: 5 | Status: SHIPPED | OUTPATIENT
Start: 2022-05-04

## 2022-05-10 RX ORDER — METHYLDOPA 250 MG
TABLET ORAL
Qty: 45 EACH | Refills: 1 | Status: SHIPPED | OUTPATIENT
Start: 2022-05-10

## 2022-06-01 RX ORDER — LANCETS 33 GAUGE
EACH MISCELLANEOUS
Qty: 100 EACH | Refills: 0 | Status: SHIPPED | OUTPATIENT
Start: 2022-06-01 | End: 2022-06-28 | Stop reason: SDUPTHER

## 2022-06-08 ENCOUNTER — NURSING HOME (OUTPATIENT)
Dept: FAMILY MEDICINE CLINIC | Age: 82
End: 2022-06-08

## 2022-06-08 VITALS
OXYGEN SATURATION: 98 % | WEIGHT: 203 LBS | BODY MASS INDEX: 33.78 KG/M2 | SYSTOLIC BLOOD PRESSURE: 135 MMHG | RESPIRATION RATE: 16 BRPM | HEART RATE: 62 BPM | TEMPERATURE: 97.7 F | DIASTOLIC BLOOD PRESSURE: 76 MMHG

## 2022-06-08 DIAGNOSIS — I10 BENIGN ESSENTIAL HYPERTENSION: ICD-10-CM

## 2022-06-08 DIAGNOSIS — D50.8 OTHER IRON DEFICIENCY ANEMIA: ICD-10-CM

## 2022-06-08 DIAGNOSIS — R42 DIZZINESS: ICD-10-CM

## 2022-06-08 DIAGNOSIS — N18.4 STAGE 4 CHRONIC KIDNEY DISEASE: ICD-10-CM

## 2022-06-08 DIAGNOSIS — E78.2 MIXED HYPERLIPIDEMIA: ICD-10-CM

## 2022-06-08 DIAGNOSIS — J30.1 SEASONAL ALLERGIC RHINITIS DUE TO POLLEN: Primary | ICD-10-CM

## 2022-06-08 DIAGNOSIS — I73.9 PERIPHERAL VASCULAR DISEASE: ICD-10-CM

## 2022-06-08 DIAGNOSIS — Z86.79 HISTORY OF CARDIOMYOPATHY: ICD-10-CM

## 2022-06-08 DIAGNOSIS — E11.40 TYPE 2 DIABETES MELLITUS WITH DIABETIC NEUROPATHY, WITHOUT LONG-TERM CURRENT USE OF INSULIN: ICD-10-CM

## 2022-06-08 DIAGNOSIS — H69.83 EUSTACHIAN TUBE DYSFUNCTION, BILATERAL: ICD-10-CM

## 2022-06-08 NOTE — PROGRESS NOTES
[facility] VISIT [place]    Chief Complaint  Dizzy spells    Subjective          Yola Dorman presents to [facility]  History of Present Illness    She continues to feel lightheaded-2 spells, she was treated with flonase NS and feels like this helped.  She is also on an allergy pill   No CP/ ear pain/palpitations.    chronic mild SOA with ambulation. She was treated for eustacian tube disorder and allergies with prednisone 40 mg daily for 3 days, zyrtec (made her feel like a zombie), flonase NS and MT for the ear-she improved, and had one more episode a month later in early May, took claritan and symptoms resolved. Carotid duplex was performed 2 weeks ago-moderate plaque    She has a pacemaker. She is on metoprolol and eliquis, sees Jose Brice    In general she is sleeping well, eating ok, BM are normal    Long term iron deficiency anemia, she is seeing Dr Burnette and gets intermittent iron infusions. She is also s/p B12 injection and is on B12 pills    Vitamin D def, stable on vitamin D pills     HTN, she is on losartan 100 mg daily and metoprolol 25 mg ER     She is sleeping well at night     She thinks she had mild depression but she states she is feeling better today.  She denies sadness.Occasional crying spells.      She is on ASA, atorvastatin, eliquis, vit c, febuxostat (gout), fish oil, lasix, floranex, losartan, metoprolol, victoza, vit B/D, iron infusions    LABS 5/23/22:   White blood cell count 4.3 hemoglobin 9.5 hematocrit 29.8 platelets 158 glucose 105, creatinine 1.92, GFR 26, sodium 145, potassium 4.5, calcium 9.2, liver function test normal, phosphorus 4.6, cholesterol 149 triglycerides 140 HDL 50 LDL 75 hemoglobin A1c 6.2% PTH 58.    She sees DR Bennett for stage 4 renal failure, creatinine 1.92, GFR 26            Review of Systems   Constitutional: Positive for fatigue. Negative for chills and fever.   HENT: Negative for ear pain, sinus pressure and sore  throat.    Eyes: Negative for blurred vision and double vision.   Respiratory: Negative for cough, shortness of breath and wheezing.    Cardiovascular: Negative for chest pain and palpitations.   Gastrointestinal: Negative for abdominal pain, blood in stool, constipation, diarrhea, nausea and vomiting.   Skin: Negative for rash.   Neurological: Positive for dizziness. Negative for headache.   Psychiatric/Behavioral: Negative for sleep disturbance, suicidal ideas and depressed mood. The patient is not nervous/anxious.         Objective   Vital Signs:   /76   Pulse 62   Temp 97.7 °F (36.5 °C)   Resp 16   Wt 92.1 kg (203 lb)   SpO2 98%   BMI 33.78 kg/m²     Physical Exam  Vitals and nursing note reviewed.   Constitutional:       General: She is not in acute distress.     Appearance: Normal appearance. She is not ill-appearing, toxic-appearing or diaphoretic.   HENT:      Head: Normocephalic and atraumatic.      Right Ear: Ear canal and external ear normal. Tympanic membrane is retracted.      Left Ear: Ear canal and external ear normal. Tympanic membrane is retracted.      Nose: No congestion or rhinorrhea.      Right Sinus: No maxillary sinus tenderness or frontal sinus tenderness.      Left Sinus: No maxillary sinus tenderness or frontal sinus tenderness.      Mouth/Throat:      Mouth: Mucous membranes are moist.      Pharynx: Oropharynx is clear. No pharyngeal swelling, oropharyngeal exudate or posterior oropharyngeal erythema.      Comments: PND noted  Eyes:      Extraocular Movements: Extraocular movements intact.      Conjunctiva/sclera: Conjunctivae normal.      Pupils: Pupils are equal, round, and reactive to light.   Cardiovascular:      Rate and Rhythm: Normal rate and regular rhythm.      Pulses:           Dorsalis pedis pulses are 2+ on the right side and 2+ on the left side.      Heart sounds: Murmur heard.    Systolic murmur is present with a grade of 3/6.  Pulmonary:      Effort: Pulmonary  effort is normal.      Breath sounds: Normal breath sounds. No wheezing, rhonchi or rales.   Abdominal:      General: Abdomen is flat.      Palpations: Abdomen is soft.   Musculoskeletal:      Cervical back: Neck supple. No rigidity.      Left foot: Deformity and bunion present.   Feet:      Right foot:      Protective Sensation: 7 sites tested. 0 sites sensed.      Skin integrity: Skin integrity normal. No ulcer, blister or callus.      Toenail Condition: Fungal disease present.     Left foot:      Protective Sensation: 7 sites tested. 0 sites sensed.      Skin integrity: Skin integrity normal. No ulcer, blister or callus.      Toenail Condition: Fungal disease present.     Comments:    severe lateral 90 degree deviation L great toe  Lymphadenopathy:      Cervical: No cervical adenopathy.   Skin:     General: Skin is warm and dry.   Neurological:      Mental Status: She is alert and oriented to person, place, and time.   Psychiatric:         Mood and Affect: Mood normal.         Behavior: Behavior normal.        Result Review :   The following data was reviewed by: Rosalind Blake MD on 03/09/2022:                          Assessment and Plan    Diagnoses and all orders for this visit:    1. Seasonal allergic rhinitis due to pollen (Primary)  Comments:  start claritan prn    2. Mixed hyperlipidemia  Comments:  stable on atorvastatin, tolerates meds well, labs reviewed    3. Type 2 diabetes mellitus with diabetic neuropathy, without long-term current use of insulin (HCC)  Comments:  very well controlled on victoza, hgba1c 6.2%, she wears surgical protective shoes due to foot deformity, recc. yearly eye exam anf flu vacc    4. Other iron deficiency anemia    5. Benign essential hypertension  Comments:  prev BP reviewed, all stable and well controlled on current meds    6. Stage 4 chronic kidney disease (HCC)  Comments:  recently saw Dr Estrada, no changes needed in current meds or treatment plan    7.  History of cardiomyopathy    8. Dizziness  Comments:  rare, cont [place] for eus. tube disorder, and check BS/BP/pulse next time this happens, give claritan prn    9. Eustachian tube dysfunction, bilateral  Comments:  hold nose and swallow 10 x per day, claritan prn    10. Peripheral vascular disease (HCC)        Follow Up   Return in about 3 months (around 9/8/2022).

## 2022-06-28 RX ORDER — LANCETS 33 GAUGE
1 EACH MISCELLANEOUS DAILY
Qty: 100 EACH | Refills: 0 | Status: SHIPPED | OUTPATIENT
Start: 2022-06-28

## 2022-09-07 ENCOUNTER — NURSING HOME (OUTPATIENT)
Dept: FAMILY MEDICINE CLINIC | Age: 82
End: 2022-09-07

## 2022-09-07 VITALS
TEMPERATURE: 97.3 F | DIASTOLIC BLOOD PRESSURE: 59 MMHG | SYSTOLIC BLOOD PRESSURE: 140 MMHG | WEIGHT: 202.6 LBS | RESPIRATION RATE: 16 BRPM | BODY MASS INDEX: 33.71 KG/M2 | HEART RATE: 66 BPM

## 2022-09-07 DIAGNOSIS — Z78.0 POSTMENOPAUSAL STATE: ICD-10-CM

## 2022-09-07 DIAGNOSIS — R25.1 EPISODE OF SHAKING: ICD-10-CM

## 2022-09-07 DIAGNOSIS — J30.1 SEASONAL ALLERGIC RHINITIS DUE TO POLLEN: ICD-10-CM

## 2022-09-07 DIAGNOSIS — G25.0 ESSENTIAL TREMOR: ICD-10-CM

## 2022-09-07 DIAGNOSIS — I73.9 PERIPHERAL VASCULAR DISEASE: ICD-10-CM

## 2022-09-07 DIAGNOSIS — E78.2 MIXED HYPERLIPIDEMIA: ICD-10-CM

## 2022-09-07 DIAGNOSIS — Z00.00 ENCOUNTER FOR ANNUAL WELLNESS EXAM IN MEDICARE PATIENT: Primary | ICD-10-CM

## 2022-09-07 DIAGNOSIS — D50.8 OTHER IRON DEFICIENCY ANEMIA: ICD-10-CM

## 2022-09-07 DIAGNOSIS — H81.13 BENIGN PAROXYSMAL POSITIONAL VERTIGO DUE TO BILATERAL VESTIBULAR DISORDER: ICD-10-CM

## 2022-09-07 DIAGNOSIS — R04.0 EPISTAXIS: ICD-10-CM

## 2022-09-07 DIAGNOSIS — Z86.79 HISTORY OF CARDIOMYOPATHY: ICD-10-CM

## 2022-09-07 DIAGNOSIS — Z95.0 CARDIAC PACEMAKER IN SITU: ICD-10-CM

## 2022-09-07 DIAGNOSIS — Z12.11 COLON CANCER SCREENING: ICD-10-CM

## 2022-09-07 DIAGNOSIS — I10 BENIGN ESSENTIAL HYPERTENSION: ICD-10-CM

## 2022-09-07 DIAGNOSIS — N18.4 STAGE 4 CHRONIC KIDNEY DISEASE: ICD-10-CM

## 2022-09-07 DIAGNOSIS — Z23 ENCOUNTER FOR IMMUNIZATION: ICD-10-CM

## 2022-09-07 DIAGNOSIS — Z12.31 ENCOUNTER FOR SCREENING MAMMOGRAM FOR BREAST CANCER: ICD-10-CM

## 2022-09-07 DIAGNOSIS — E11.40 TYPE 2 DIABETES MELLITUS WITH DIABETIC NEUROPATHY, WITHOUT LONG-TERM CURRENT USE OF INSULIN: ICD-10-CM

## 2022-09-07 PROCEDURE — 1170F FXNL STATUS ASSESSED: CPT | Performed by: FAMILY MEDICINE

## 2022-09-07 PROCEDURE — G0439 PPPS, SUBSEQ VISIT: HCPCS | Performed by: FAMILY MEDICINE

## 2022-09-07 PROCEDURE — 1159F MED LIST DOCD IN RCRD: CPT | Performed by: FAMILY MEDICINE

## 2022-09-07 NOTE — PROGRESS NOTES
[facility] HOME VISIT  The ABCs of the Annual Wellness Visit  Cc: Subsequent Medicare Wellness Visit  Feels like she is vibrating all over at night, occassional dizzy spells    Subjective    History of Present Illness:  Yola Dorman is a 81 y.o. female who presents for a Subsequent Medicare Wellness Visit.    The following portions of the patient's history were reviewed and   updated as appropriate: allergies, current medications, past family history, past medical history, past social history, past surgical history and problem list.    Compared to one year ago, the patient feels her physical   health is the same.    Compared to one year ago, the patient feels her mental   health is the same.    Recent Hospitalizations:  She was not admitted to the hospital during the last year.     She c/o feeling like she is vibrating all over in her body, and she has also had a few episodes of dizzy spells, like the room is spinning and this occurs with head movements, Intermittent and occurred on 2 different episodes    Chronic allergies are stable on cetirizine prn.       Carotid duplex -moderate plaque-she sees vascular specialist, conservative treatment for now, on ASA     She has a pacemaker and cardiomyopathy. She is on metoprolol and eliquis, sees Jose Brice.  HTN, she is on losartan 100 mg daily and metoprolol 25 mg ER, tolerates meds well and BP are well controlled     In general she is sleeping well, eating ok, BM are normal     Long term iron deficiency anemia, she is seeing Dr Burnette and gets intermittent iron infusions. She is also s/p B12 injection and is on B12 supplementation     Vitamin D def, stable on vitamin D supplementation     Type 2 diabetes, sister checks her BS once a day and needs her test strips ordered.  She is stable and well controlled on current medications.      She is sleeping well at night, denies si/sx of depression     .                    Current Medical  Providers:  Patient Care Team:  Rosalind Blake MD as PCP - General (Family Medicine)  Logan Lopez APRN as Nurse Practitioner (Nurse Practitioner)  Jose Brice APRN (Family Medicine)  Krzysztof Nicholas APRN (Nurse Practitioner)  Dayo Beck MD PhD as Consulting Physician (Ophthalmology)  Kanu Dewitt DPM as Consulting Physician (Podiatry)  Bryant Burnette MD (Hematology and Oncology)  Michael Cochran MD as Consulting Physician (Pulmonary Disease)  Kanu Bennett MD as Consulting Physician (Nephrology)    MEDS REVIEWED AND SIGNED ON CHART: Aspirin, atorvastatin, Eliquis 2.5 mg twice daily, vitamin C,FEBUXOSTAT, Floranex, Lasix 40 mg daily, Dr. Hernandez daily, metoprolol 25 mg daily, Victoza 0.6 mg daily, vitamin B12, vitamin D3, Zyrtec as needed nasal saline spray as needed, IRON INFUSIONS    No opioid medication identified on active medication list. I have reviewed chart for other potential  high risk medication/s and harmful drug interactions in the elderly.          Aspirin is on active medication list. Aspirin use is indicated based on review of current medical condition/s. Pros and cons of this therapy have been discussed today. Benefits of this medication outweigh potential harm.  Patient has been encouraged to continue taking this medication.  .      Patient Active Problem List   Diagnosis   • Sleep apnea   • Hyperlipidemia   • Diabetes mellitus (HCC)   • Arthritis   • Allergic   • Cataract   • Anemia   • Aortic valve stenosis   • Atrioventricular block   • Benign essential hypertension   • Chronic coronary artery disease   • Presence of cardiac pacemaker   • Renal insufficiency   • Sick sinus syndrome (HCC)   • Allergic rhinitis   • Atrial fibrillation (HCC)   • Atherosclerosis of both carotid arteries   • Gout   • History of cardiomyopathy   • Peripheral vascular disease (HCC)   • Ischemic heart disease   • Osteoporosis   • Stage 4 chronic kidney disease (HCC)    • Anemia   • Type 2 diabetes mellitus (HCC)   • Hyperlipidemia   • Cardiac pacemaker in situ   • Obstructive sleep apnea     Advance Care Planning  Advance Directive is not on file.  ACP discussion was held with the patient during this visit. Patient has an advance directive (not in EMR), copy requested.    Review of Systems   Constitutional: Positive for fatigue. Negative for chills and fever.   HENT: Negative for ear pain, sinus pressure and sore throat.    Respiratory: Negative for cough, shortness of breath and wheezing.    Cardiovascular: Negative for chest pain, palpitations and leg swelling.   Gastrointestinal: Negative for abdominal pain, blood in stool, constipation, diarrhea, nausea and vomiting.   Genitourinary: Negative for dysuria.   Skin: Negative for rash.   Neurological: Positive for dizziness.   Psychiatric/Behavioral: Negative for sleep disturbance and suicidal ideas.        Objective    Vitals:    09/07/22 1011   BP: 140/59   Pulse: 66   Resp: 16   Temp: 97.3 °F (36.3 °C)   Weight: 91.9 kg (202 lb 9.6 oz)     BMI Readings from Last 1 Encounters:   09/07/22 33.71 kg/m²   BMI is below normal parameters. Recommendations include: pt defers dietician referral  Body mass index is 33.71 kg/m².  BMI has not been calculated during today's encounter.     Does the patient have evidence of cognitive impairment? No    Physical Exam  Vitals and nursing note reviewed.   Constitutional:       General: She is not in acute distress.     Appearance: Normal appearance. She is not ill-appearing, toxic-appearing or diaphoretic.   HENT:      Head: Normocephalic and atraumatic.      Right Ear: Tympanic membrane, ear canal and external ear normal.      Left Ear: Tympanic membrane, ear canal and external ear normal.      Nose: No congestion or rhinorrhea.      Mouth/Throat:      Mouth: Mucous membranes are moist.      Pharynx: Oropharynx is clear. No oropharyngeal exudate or posterior oropharyngeal erythema.   Eyes:       Extraocular Movements: Extraocular movements intact.      Conjunctiva/sclera: Conjunctivae normal.      Pupils: Pupils are equal, round, and reactive to light.   Cardiovascular:      Rate and Rhythm: Normal rate and regular rhythm.      Heart sounds: Normal heart sounds.   Pulmonary:      Effort: Pulmonary effort is normal.      Breath sounds: Normal breath sounds. No wheezing, rhonchi or rales.   Chest:   Breasts:      Right: Normal.      Left: Normal.       Abdominal:      General: Abdomen is flat.      Palpations: Abdomen is soft.   Musculoskeletal:      Cervical back: Neck supple. No rigidity.   Lymphadenopathy:      Cervical: No cervical adenopathy.   Skin:     General: Skin is warm and dry.   Neurological:      Mental Status: She is alert and oriented to person, place, and time.      Comments: Neg UE cogwheeling  Tremor with intention only  Slowed gait without difficulty initiating gain and picking up her feet   Psychiatric:         Attention and Perception: Attention normal.         Mood and Affect: Mood normal.         Speech: Speech normal.         Behavior: Behavior normal.         Thought Content: Thought content normal.         Cognition and Memory: Cognition normal.         Judgment: Judgment normal.               LABS 5/23/22:   White blood cell count 4.3 hemoglobin 9.5 hematocrit 29.8 platelets 158 glucose 105, creatinine 1.92, GFR 26, sodium 145, potassium 4.5, calcium 9.2, liver function test normal, phosphorus 4.6, cholesterol 149 triglycerides 140 HDL 50 LDL 75 hemoglobin A1c 6.2% PTH 58.    She sees DR Bennett for stage 4 renal failure, creatinine 1.92, GFR 26      HEALTH RISK ASSESSMENT    Smoking Status:  Social History     Tobacco Use   Smoking Status Never Smoker   Smokeless Tobacco Never Used     Alcohol Consumption:  Social History     Substance and Sexual Activity   Alcohol Use No     Fall Risk Screen:    STEADI Fall Risk Assessment was completed, and patient is at LOW risk for  falls.Assessment completed on:9/7/2022    Depression Screening:  PHQ-2/PHQ-9 Depression Screening 9/7/2022   Retired Total Score -   Little Interest or Pleasure in Doing Things 0-->not at all   Feeling Down, Depressed or Hopeless 0-->not at all   PHQ-9: Brief Depression Severity Measure Score 0       Health Habits and Functional and Cognitive Screening:  Functional & Cognitive Status 9/7/2022   Do you have difficulty preparing food and eating? No   Do you have difficulty bathing yourself, getting dressed or grooming yourself? Yes   Do you have difficulty using the toilet? No   Do you have difficulty moving around from place to place? No   Do you have trouble with steps or getting out of a bed or a chair? No   Current Diet Well Balanced Diet   Dental Exam Up to date   Eye Exam Up to date   Exercise (times per week) 7 times per week   Current Exercises Include Stationary Bicycling/Spin Class   Do you need help using the phone?  No   Are you deaf or do you have serious difficulty hearing?  No   Do you need help with transportation? Yes   Do you need help shopping? Yes   Do you need help preparing meals?  Yes   Do you need help with housework?  Yes   Do you need help with laundry? No   Do you need help taking your medications? No   Do you need help managing money? No   Do you ever drive or ride in a car without wearing a seat belt? No   Have you felt unusual stress, anger or loneliness in the last month? No   Who do you live with? Community   If you need help, do you have trouble finding someone available to you? No   Have you been bothered in the last four weeks by sexual problems? No   Do you have difficulty concentrating, remembering or making decisions? No       Age-appropriate Screening Schedule:  Refer to the list below for future screening recommendations based on patient's age, sex and/or medical conditions. Orders for these recommended tests are listed in the plan section. The patient has been provided with a  written plan.    Health Maintenance   Topic Date Due   • URINE MICROALBUMIN  Never done   • DXA SCAN  01/16/2016   • INFLUENZA VACCINE  10/01/2022   • HEMOGLOBIN A1C  11/23/2022   • LIPID PANEL  05/23/2023   • DIABETIC EYE EXAM  07/27/2023   • TDAP/TD VACCINES (2 - Td or Tdap) 06/21/2025   • ZOSTER VACCINE  Completed   • MAMMOGRAM  Discontinued                The following data was reviewed by: Rosalind Blake MD on 09/07/2022:                           Assessment & Plan   CMS Preventative Services Quick Reference  Risk Factors Identified During Encounter  Cardiovascular Disease  Immunizations Discussed/Encouraged (specific Immunizations; Influenza and Prevnar 20 (Pneumococcal 20-valent conjugate)  Inactivity/Sedentary  Obesity/Overweight   The above risks/problems have been discussed with the patient.  Follow up actions/plans if indicated are seen below in the Assessment/Plan Section.  Pertinent information has been shared with the patient in the After Visit Summary.    Diagnoses and all orders for this visit:    1. Encounter for annual wellness exam in Medicare patient (Primary)  Comments:  He was septated.  Due for DEXA and Prevnar 20 vaccine.  Done with colonoscopies and mammograms.  Living well on chart.    2. Encounter for screening mammogram for breast cancer    3. Encounter for immunization    4. Colon cancer screening    5. Postmenopausal state    6. Seasonal allergic rhinitis due to pollen    7. Mixed hyperlipidemia  Comments:  stable on atorvastatin, tolerates med well, no changes needed in current meds or treatment plan    8. Type 2 diabetes mellitus with diabetic neuropathy, without long-term current use of insulin (HCC)  Comments:  stable and very well controlled on victoza, which also helps her maintain her wt. BS diary BS < 140 daily, she checks her BS daily    9. Other iron deficiency anemia    10. Benign essential hypertension  Comments:  stable on losartan/metoprolol, lasix, tolerates med  "well, no changes needed in current meds or treatment plan    11. Stage 4 chronic kidney disease (HCC)  Comments:  sees nephrology felicia Corado    12. History of cardiomyopathy  Comments:  stable on BB/ARB and lasix, f/u cardiology q6 months, no acute issues CP/SOA, LE edema stable and well controlled.    13. Peripheral vascular disease (HCC)  Comments:  stable on eliquis and baby ASA    14. Cardiac pacemaker in situ    15. Episode of shaking    16. Benign paroxysmal positional vertigo due to bilateral vestibular disorder  Comments:  will set her up with PT to perform epley manuevers    17. Epistaxis  Comments:  improved with AYR nasal saline spray prior to bedtime for her cpap that is \"drying her out\"    18. Essential tremor  Comments:  parkinson work up negative, recommend propranolol at night and she defers        Follow Up:   Return in about 3 months (around 12/7/2022) for Recheck.     An After Visit Summary and PPPS were made available to the patient.                   "

## 2022-10-06 PROCEDURE — G0180 MD CERTIFICATION HHA PATIENT: HCPCS | Performed by: FAMILY MEDICINE

## 2022-10-07 ENCOUNTER — OUTSIDE FACILITY SERVICE (OUTPATIENT)
Dept: FAMILY MEDICINE CLINIC | Age: 82
End: 2022-10-07

## 2022-11-16 PROCEDURE — G0179 MD RECERTIFICATION HHA PT: HCPCS | Performed by: FAMILY MEDICINE

## 2022-11-21 ENCOUNTER — OUTSIDE FACILITY SERVICE (OUTPATIENT)
Dept: FAMILY MEDICINE CLINIC | Age: 82
End: 2022-11-21

## 2022-11-28 RX ORDER — METHYLDOPA 250 MG
TABLET ORAL
Qty: 15 TABLET | Refills: 6 | OUTPATIENT
Start: 2022-11-28

## 2022-12-01 RX ORDER — FUROSEMIDE 40 MG/1
TABLET ORAL
Qty: 30 TABLET | Refills: 5 | Status: SHIPPED | OUTPATIENT
Start: 2022-12-01

## 2022-12-07 ENCOUNTER — NURSING HOME (OUTPATIENT)
Dept: FAMILY MEDICINE CLINIC | Age: 82
End: 2022-12-07

## 2022-12-07 VITALS
RESPIRATION RATE: 16 BRPM | DIASTOLIC BLOOD PRESSURE: 56 MMHG | WEIGHT: 196 LBS | HEIGHT: 66 IN | BODY MASS INDEX: 31.5 KG/M2 | TEMPERATURE: 97.5 F | SYSTOLIC BLOOD PRESSURE: 162 MMHG | HEART RATE: 68 BPM | OXYGEN SATURATION: 97 %

## 2022-12-07 DIAGNOSIS — D50.8 OTHER IRON DEFICIENCY ANEMIA: ICD-10-CM

## 2022-12-07 DIAGNOSIS — E78.2 MIXED HYPERLIPIDEMIA: ICD-10-CM

## 2022-12-07 DIAGNOSIS — I10 BENIGN ESSENTIAL HYPERTENSION: ICD-10-CM

## 2022-12-07 DIAGNOSIS — I48.0 PAROXYSMAL ATRIAL FIBRILLATION: ICD-10-CM

## 2022-12-07 DIAGNOSIS — N18.4 STAGE 4 CHRONIC KIDNEY DISEASE: Primary | ICD-10-CM

## 2022-12-07 DIAGNOSIS — G25.0 ESSENTIAL TREMOR: ICD-10-CM

## 2022-12-07 DIAGNOSIS — I25.10 CHRONIC CORONARY ARTERY DISEASE: ICD-10-CM

## 2022-12-07 DIAGNOSIS — E11.40 TYPE 2 DIABETES MELLITUS WITH DIABETIC NEUROPATHY, WITHOUT LONG-TERM CURRENT USE OF INSULIN: ICD-10-CM

## 2022-12-07 DIAGNOSIS — B37.9 CANDIDIASIS: ICD-10-CM

## 2022-12-07 NOTE — PROGRESS NOTES
[facility] VISIT [place]    Chief Complaint  Body is vigrating with room spinning      Subjective          Yola Dorman presents to [facility]  History of Present Illness  She c/o feeling like she is vibrating all over in her body, and she has also had a few episodes of dizzy spells, like the room is spinning and this occurs with head movements, Intermittent and occurred on 2 different episodes    Chronic allergies are stable and unchanged on cetirizine prn.        F/u HTN with cardiomyopathy, she has a pacemaker. She is on metoprolol and eliquis, sees Jose Brice.  HTN well controlled on losartan 100 mg daily and metoprolol 25 mg ER, tolerates meds well .    Ssleeping OK, eating ok, BM are normal     Iron deficiency anemia, she is seeing Dr Burnette for intermittent iron infusions. She is also on B12 injection and is on B12 supplementation     Vitamin D def, stable on vitamin D supplementation     Type 2 diabetes is stable and well controlled on current meds, sister tests her BS daily, no acute issues. She is stable and well controlled on current medications.      She is sleeping well at night, denies si/sx of depression    SHE IS HAVING MORE LEG CRAMPS AND SHE IS DRINKING TONIC WATER AT NIGHT AND IT IS HELPING THE MUSCLE CRAMPS.    LABS NOV 28 2022 REVIEWED WITH PATIENT: White blood cell count is 6.0 hemoglobin 10.2 hematocrit 31.9, platelets 161, blood sugar 94, creatinine 2.05, GFR 24, sodium 143, potassium 4.4, calcium 9.4, alkaline phosphatase 78, AST 18, ALT 15, cholesterol 124, triglycerides 123, HDL 42, LDL 60, hemoglobin A1c 6.0, phosphorus 4.9, PTH 51     SEPT HGB 7.8, HCT 24.9    Current medications include aspirin 81 mg EC, Lipitor 10 mg nightly, Eliquis 2.5 mg twice daily, vitamin C,FEBUXOSTAT 40 MG Lasix 40 mg daily, losartan 100 mg daily, metoprolol 25 mg daily,, Victoza 0.6 mg daily, vitamin B12 2500 mcg daily, vitamin D 1000 units daily      Review of  "Systems   Constitutional: Positive for fatigue. Negative for chills and fever.   HENT: Negative for ear pain, sinus pressure and sore throat.    Eyes: Negative for blurred vision and double vision.   Respiratory: Negative for cough, shortness of breath and wheezing.    Cardiovascular: Negative for chest pain and palpitations.   Gastrointestinal: Negative for abdominal pain, blood in stool, constipation, diarrhea, nausea and vomiting.   Skin: Negative for rash.   Neurological: Negative for dizziness and headache.   Psychiatric/Behavioral: Negative for depressed mood.        Objective   Vital Signs:   /56   Pulse 68   Temp 97.5 °F (36.4 °C)   Resp 16   Ht 166.4 cm (65.5\")   Wt 88.9 kg (196 lb)   SpO2 97%   BMI 32.12 kg/m²     Physical Exam  Vitals and nursing note reviewed.   Constitutional:       General: She is not in acute distress.     Appearance: Normal appearance. She is not ill-appearing, toxic-appearing or diaphoretic.   HENT:      Head: Normocephalic and atraumatic.      Right Ear: Tympanic membrane, ear canal and external ear normal.      Left Ear: Tympanic membrane, ear canal and external ear normal.      Nose: No congestion or rhinorrhea.      Mouth/Throat:      Mouth: Mucous membranes are moist.      Pharynx: Oropharynx is clear. No oropharyngeal exudate or posterior oropharyngeal erythema.   Eyes:      Extraocular Movements: Extraocular movements intact.      Conjunctiva/sclera: Conjunctivae normal.      Pupils: Pupils are equal, round, and reactive to light.   Cardiovascular:      Rate and Rhythm: Normal rate and regular rhythm.      Pulses:           Dorsalis pedis pulses are 1+ on the right side and 1+ on the left side.      Heart sounds: Murmur heard.    Systolic murmur is present with a grade of 4/6.  Pulmonary:      Effort: Pulmonary effort is normal.      Breath sounds: Normal breath sounds. No wheezing, rhonchi or rales.   Abdominal:      General: Abdomen is flat.      Palpations: " Abdomen is soft.   Musculoskeletal:      Cervical back: Neck supple. No rigidity.      Right foot: No deformity or bunion.      Left foot: Deformity and bunion present.   Feet:      Right foot:      Protective Sensation: 7 sites tested. 0 sites sensed.      Skin integrity: Callus present. No ulcer, blister, erythema or dry skin.      Toenail Condition: Right toenails are abnormally thick. Fungal disease present.     Left foot:      Protective Sensation: 7 sites tested. 0 sites sensed.      Skin integrity: Erythema and callus present. No ulcer, blister or dry skin.      Toenail Condition: Left toenails are normal.      Comments: Diabetic Foot Exam Performed and Monofilament Test Performed     Lymphadenopathy:      Cervical: No cervical adenopathy.   Skin:     General: Skin is warm and dry.   Neurological:      Mental Status: She is alert and oriented to person, place, and time.   Psychiatric:         Mood and Affect: Mood normal.         Behavior: Behavior normal.        Result Review :   The following data was reviewed by: Rosalind Blake MD on 12/07/2022:                          Assessment and Plan    Diagnoses and all orders for this visit:    1. Stage 4 chronic kidney disease (HCC) (Primary)  Comments:  Creatinine is worse.  Push fluids and avoid NSAIDs she is to follow-up with Dr. Shepard as directed    2. Other iron deficiency anemia  Comments:  She is on iron infusions and her hemoglobin is stable, hematocrit is 30    3. Type 2 diabetes mellitus with diabetic neuropathy, without long-term current use of insulin (HCC)  Comments:  Stable and very well controlled with Victoza.  No changes needed in current meds or treatment plan    4. Mixed hyperlipidemia  Comments:  Stable on Lipitor.  We will add coenzyme Q 10 for muscle aches/leg aches    5. Paroxysmal atrial fibrillation (HCC)  Comments:  Stable on current meds.  Follow-up with cardiology as directed    6. Benign essential hypertension  Comments:  PT  DEFERS MED CHANGES, instead will start checking blood pressures once a week x4 weeks    7. Essential tremor  Comments:  Stable on beta-blocker    8. Chronic coronary artery disease  Comments:  No acute issues    9. Candidiasis  Comments:  Left foot.  We will treat with topical nystatin cream until healed        Follow Up   Return in about 3 months (around 3/7/2023) for Recheck.

## 2023-01-16 RX ORDER — FEBUXOSTAT 40 MG/1
TABLET, FILM COATED ORAL
Qty: 30 TABLET | Refills: 5 | OUTPATIENT
Start: 2023-01-16

## 2023-01-16 RX ORDER — LOSARTAN POTASSIUM 100 MG/1
TABLET ORAL
Qty: 30 TABLET | Refills: 5 | OUTPATIENT
Start: 2023-01-16

## 2023-01-16 RX ORDER — ASPIRIN 81 MG/1
TABLET, COATED ORAL
Qty: 30 TABLET | Refills: 5 | OUTPATIENT
Start: 2023-01-16

## 2023-03-03 ENCOUNTER — PRIOR AUTHORIZATION (OUTPATIENT)
Dept: FAMILY MEDICINE CLINIC | Age: 83
End: 2023-03-03
Payer: MEDICARE

## 2023-03-03 NOTE — TELEPHONE ENCOUNTER
Completed PA for Febuxostat via phone [phone], spoke to [place] Pharmacist.    PA approved 1/1/2023-3/2/2024  Case #: [id number]    Notified Mally at [facility] [place] and Rob at [facility].

## 2023-03-17 ENCOUNTER — NURSING HOME (OUTPATIENT)
Dept: FAMILY MEDICINE CLINIC | Age: 83
End: 2023-03-17
Payer: MEDICARE

## 2023-03-17 VITALS
RESPIRATION RATE: 16 BRPM | WEIGHT: 195 LBS | BODY MASS INDEX: 31.96 KG/M2 | HEART RATE: 65 BPM | OXYGEN SATURATION: 98 % | DIASTOLIC BLOOD PRESSURE: 56 MMHG | SYSTOLIC BLOOD PRESSURE: 149 MMHG | TEMPERATURE: 97.4 F

## 2023-03-17 DIAGNOSIS — N18.4 STAGE 4 CHRONIC KIDNEY DISEASE: ICD-10-CM

## 2023-03-17 DIAGNOSIS — E11.621 DIABETIC ULCER OF LEFT MIDFOOT ASSOCIATED WITH TYPE 2 DIABETES MELLITUS, LIMITED TO BREAKDOWN OF SKIN: ICD-10-CM

## 2023-03-17 DIAGNOSIS — I25.10 CHRONIC CORONARY ARTERY DISEASE: ICD-10-CM

## 2023-03-17 DIAGNOSIS — Z95.0 CARDIAC PACEMAKER IN SITU: ICD-10-CM

## 2023-03-17 DIAGNOSIS — J30.9 ALLERGIC SINUSITIS: ICD-10-CM

## 2023-03-17 DIAGNOSIS — I48.0 PAROXYSMAL ATRIAL FIBRILLATION: ICD-10-CM

## 2023-03-17 DIAGNOSIS — I10 BENIGN ESSENTIAL HYPERTENSION: ICD-10-CM

## 2023-03-17 DIAGNOSIS — E78.00 PURE HYPERCHOLESTEROLEMIA: ICD-10-CM

## 2023-03-17 DIAGNOSIS — G47.33 OBSTRUCTIVE SLEEP APNEA: ICD-10-CM

## 2023-03-17 DIAGNOSIS — G25.0 ESSENTIAL TREMOR: ICD-10-CM

## 2023-03-17 DIAGNOSIS — L97.421 DIABETIC ULCER OF LEFT MIDFOOT ASSOCIATED WITH TYPE 2 DIABETES MELLITUS, LIMITED TO BREAKDOWN OF SKIN: ICD-10-CM

## 2023-03-17 DIAGNOSIS — E11.40 TYPE 2 DIABETES MELLITUS WITH DIABETIC NEUROPATHY, WITHOUT LONG-TERM CURRENT USE OF INSULIN: Primary | ICD-10-CM

## 2023-03-17 DIAGNOSIS — D50.8 OTHER IRON DEFICIENCY ANEMIA: ICD-10-CM

## 2023-03-17 PROCEDURE — 99348 HOME/RES VST EST LOW MDM 30: CPT | Performed by: FAMILY MEDICINE

## 2023-03-17 NOTE — PROGRESS NOTES
[facility] [place]    Chief Complaint  Body is vigrating with room spinning      Subjective          Yola Dorman presents to [facility]  History of Present Illness    Estefany has a wound on her left foot medial 1st metatarsal, diabetic foot ulcer, it is dressed today and she does not want me to take a look at it, she is under treatment and management with podiatry and it is healing. She was on abx for past 4 weeks    Ongoing intermittent vibrating all over in her body and she thinks this is worse, slowing worsening, and tremor with intention, she is also having trouble with her voice     Chronic allergies are worse since the weather has changed and is warming.  She is not on any meds at this time, she is on cetirizine prn-it makes her groggy.        F/u HTN with cardiomyopathy, she has a pacemaker. She is on metoprolol and eliquis, sees cardiologist Jose Brice.  BP is well controlled on losartan 100 mg daily and metoprolol 25 mg ER, tolerates meds well .    Ssleeping OK, eating ok, BM are normal    Her BPPV resolved     Iron deficiency anemia, she is seeing Dr Burnette for intermittent iron infusions. She is also on B12 injection and is on B12 supplementation.  Recent HCT is improved at 31     Vitamin D def, stable on vitamin D supplementation     Type 2 diabetes is stable and well controlled on Victoza, she has had one low BS reading of 69 and she felt fine.  She tests her BS daily. She is stable and well controlled on current medications. hgba1c was 7.1%,  No foot issues.  Last eye exam:      She is sleeping well at night, denies si/sx of depression/anxiety    SHE IS HAVING MORE LEG CRAMPS AND SHE IS DRINKING TONIC WATER AT NIGHT AND IT IS HELPING THE MUSCLE CRAMPS.    LABS Feb 8, 2023: REVIEWED WITH PATIENT: Hemoglobin A1c is 7.1%, white blood cell count 6.5, hemoglobin 9.7, hematocrit 29.6, weight is 148, glucose 104, BUN 55, creatinine 1.61, GFR 32, protein 5.9,  calcium 9.3, alkaline phosphatase 93, AST 25, ALT 25, cholesterol 143, triglycerides 102, HDL 48, LDL 76, iron 53, ferritin 251  Recent CBC on 3/7/2023: WBC 5.1, hemoglobin 9.9, hematocrit 31.2, platelets 142        Current medications include aspirin 81 mg EC, Lipitor 10 mg 1/2 nightly, Eliquis 2.5 mg twice daily, vitamin C,FEBUXOSTAT 40 MG, Lasix 40 mg daily, losartan 100 mg daily, metoprolol 25 mg daily,, Victoza 0.6 mg daily, vitamin B12 2500 mcg daily, vitamin D 1000 units daily, coenzyme Q104 week course of doxycycline.            Review of Systems   Constitutional: Positive for fatigue. Negative for chills and fever.   HENT: Negative for ear pain, sinus pressure and sore throat.    Eyes: Negative for blurred vision and double vision.   Respiratory: Negative for cough, shortness of breath and wheezing.    Cardiovascular: Negative for chest pain and palpitations.   Gastrointestinal: Negative for abdominal pain, blood in stool, constipation, diarrhea, nausea and vomiting.   Skin: Negative for rash.   Neurological: Negative for dizziness and headache.   Psychiatric/Behavioral: Negative for depressed mood.        Objective   Vital Signs:   /56   Pulse 65   Temp 97.4 °F (36.3 °C)   Resp 16   Wt 88.5 kg (195 lb)   SpO2 98%   BMI 31.96 kg/m²     Physical Exam  Vitals and nursing note reviewed.   Constitutional:       General: She is not in acute distress.     Appearance: Normal appearance. She is not ill-appearing, toxic-appearing or diaphoretic.   HENT:      Head: Normocephalic and atraumatic.      Right Ear: Tympanic membrane, ear canal and external ear normal.      Left Ear: Tympanic membrane, ear canal and external ear normal.      Nose: No congestion or rhinorrhea.      Mouth/Throat:      Mouth: Mucous membranes are moist.      Pharynx: Oropharynx is clear. No oropharyngeal exudate or posterior oropharyngeal erythema.   Eyes:      Extraocular Movements: Extraocular movements intact.       Conjunctiva/sclera: Conjunctivae normal.      Pupils: Pupils are equal, round, and reactive to light.   Cardiovascular:      Rate and Rhythm: Normal rate and regular rhythm.      Pulses:           Dorsalis pedis pulses are 1+ on the right side and 1+ on the left side.      Heart sounds: Murmur heard.    Systolic murmur is present with a grade of 4/6.  Pulmonary:      Effort: Pulmonary effort is normal.      Breath sounds: Normal breath sounds. No wheezing, rhonchi or rales.   Abdominal:      General: Abdomen is flat.      Palpations: Abdomen is soft.   Musculoskeletal:      Cervical back: Neck supple. No rigidity.      Right foot: No deformity or bunion.      Left foot: Deformity and bunion present.   Feet:      Right foot:      Protective Sensation: 7 sites tested. 2 sites sensed.      Skin integrity: No ulcer.      Left foot:      Protective Sensation: 7 sites tested. 0 sites sensed.      Skin integrity: Ulcer and erythema present.      Comments: Diabetic Foot Exam Performed and Monofilament Test Performed       L foot ulcer seen via picture taken today is C/D/I and healing nicely, mild erythema around the open wound approx 1 cm in size  Lymphadenopathy:      Cervical: No cervical adenopathy.   Skin:     General: Skin is warm and dry.   Neurological:      Mental Status: She is alert and oriented to person, place, and time.   Psychiatric:         Mood and Affect: Mood normal.         Behavior: Behavior normal.        Result Review :   The following data was reviewed by: Rosalind Blake MD on 12/07/2022:                            Assessment and Plan    Diagnoses and all orders for this visit:    1. Type 2 diabetes mellitus with diabetic neuropathy, without long-term current use of insulin (HCC) (Primary)  Comments:  stable and very well controlled at this time    2. Other iron deficiency anemia  Comments:  Stable and improved, continue iron infusions and B12 supplementation.  She is to follow-up with hematology  as directed    3. Benign essential hypertension  Comments:  overall stable and well-controlled on current meds.  Borderline elevated today.  Continue current treatment plan    4. Essential tremor  Comments:  intention tremor, [facility] MRI brain and neurology -she defers meds to help with tremor and defers work up at this time    5. Chronic coronary artery disease  Comments:  No acute issues    6. Stage 4 chronic kidney disease (HCC)  Comments:  Kidney function is improved to stage IIIb.  She avoid NSAIDs and push fluids 64 ounces a day    7. Paroxysmal atrial fibrillation (HCC)  Comments:  Normal sinus rhythm today stable on current meds including Eliquis    8. Cardiac pacemaker in situ    9. Pure hypercholesterolemia  Comments:  Stable on Lipitor, hypertension cholesterol is very well controlled.  No changes made in current meds/treatment plan    10. And usage aware of CPAP  Comments:  She wears a CPAP at night and tolerates it well    11. Allergic sinusitis  Comments:  Worse but she prefers to take Zyrtec only as needed    12. Diabetic ulcer of left midfoot associated with type 2 diabetes mellitus, limited to breakdown of skin (HCC)  Comments:  Under treatment with podiatry.  She recently completed 4 weeks of doxycycline and is starting to heal nicely        Follow Up   Return in about 3 months (around 6/17/2023).

## 2023-05-23 RX ORDER — ATORVASTATIN CALCIUM 10 MG/1
TABLET, FILM COATED ORAL
Qty: 15 TABLET | Refills: 5 | Status: SHIPPED | OUTPATIENT
Start: 2023-05-23

## 2023-06-07 ENCOUNTER — NURSING HOME (OUTPATIENT)
Dept: FAMILY MEDICINE CLINIC | Age: 83
End: 2023-06-07
Payer: MEDICARE

## 2023-06-07 VITALS
HEART RATE: 63 BPM | BODY MASS INDEX: 33.27 KG/M2 | OXYGEN SATURATION: 97 % | SYSTOLIC BLOOD PRESSURE: 158 MMHG | DIASTOLIC BLOOD PRESSURE: 53 MMHG | TEMPERATURE: 97.2 F | WEIGHT: 203 LBS | RESPIRATION RATE: 18 BRPM

## 2023-06-07 DIAGNOSIS — L97.421 DIABETIC ULCER OF LEFT MIDFOOT ASSOCIATED WITH TYPE 2 DIABETES MELLITUS, LIMITED TO BREAKDOWN OF SKIN: ICD-10-CM

## 2023-06-07 DIAGNOSIS — N18.32 STAGE 3B CHRONIC KIDNEY DISEASE: ICD-10-CM

## 2023-06-07 DIAGNOSIS — I73.9 PERIPHERAL VASCULAR DISEASE: ICD-10-CM

## 2023-06-07 DIAGNOSIS — E11.621 DIABETIC ULCER OF LEFT MIDFOOT ASSOCIATED WITH TYPE 2 DIABETES MELLITUS, LIMITED TO BREAKDOWN OF SKIN: ICD-10-CM

## 2023-06-07 DIAGNOSIS — I48.0 PAROXYSMAL ATRIAL FIBRILLATION: ICD-10-CM

## 2023-06-07 DIAGNOSIS — E11.40 TYPE 2 DIABETES MELLITUS WITH DIABETIC NEUROPATHY, WITHOUT LONG-TERM CURRENT USE OF INSULIN: Primary | ICD-10-CM

## 2023-06-07 DIAGNOSIS — I25.10 CHRONIC CORONARY ARTERY DISEASE: ICD-10-CM

## 2023-06-07 DIAGNOSIS — J30.1 SEASONAL ALLERGIC RHINITIS DUE TO POLLEN: ICD-10-CM

## 2023-06-07 DIAGNOSIS — G25.0 ESSENTIAL TREMOR: ICD-10-CM

## 2023-06-07 DIAGNOSIS — I35.0 AORTIC VALVE STENOSIS, ETIOLOGY OF CARDIAC VALVE DISEASE UNSPECIFIED: ICD-10-CM

## 2023-06-07 DIAGNOSIS — I10 BENIGN ESSENTIAL HYPERTENSION: ICD-10-CM

## 2023-06-07 DIAGNOSIS — G63 POLYNEUROPATHY ASSOCIATED WITH UNDERLYING DISEASE: ICD-10-CM

## 2023-06-07 DIAGNOSIS — D50.8 OTHER IRON DEFICIENCY ANEMIA: ICD-10-CM

## 2023-06-07 DIAGNOSIS — E78.00 PURE HYPERCHOLESTEROLEMIA: ICD-10-CM

## 2023-06-07 DIAGNOSIS — Z95.0 CARDIAC PACEMAKER IN SITU: ICD-10-CM

## 2023-06-07 NOTE — PROGRESS NOTES
[facility]      Chief Complaint  Foot pain      Subjective        History of Present Illness    Estefany has a wound on her left foot medial 1st metatarsal, diabetic foot ulcer, it is rosita I have provided most recent picture of the foot ulcer and it actually looks good, no signs of infection.  Ssed today and she does not want me to take a look at it, she is under treatment and management with podiatry and it is healing.     Chronic allergies are stable, not on any meds       She presents for f/u for her HTN with cardiomyopathy, she has a pacemaker. She is on metoprolol and eliquis, sees cardiologist Jose Brice.  BP is well controlled on losartan 100 mg daily and metoprolol 25 mg ER, tolerates meds well .  Her weight is up    Her balance feels like it is worse, no feelings of vibration or dizzy spells, she uses her rollator every where she goes     Iron deficiency anemia, she is seeing Dr Burnette for intermittent iron infusions. She is also on B12 injection and is on B12 supplementation.  Recent HCT is improved at 31     Vitamin D def, stable on vitamin D supplementation     Type 2 diabetes is stable and well controlled on Victoza, she tolerates med well  She tests her BS daily, running < 100 consistently, no hypoglycemia. She is stable and well controlled on current medications. hgba1c was 6.3%,  No foot issues.  Last eye exam: She gets these performed yearly       LABS May 24, 2023: WBC 3.3, hemoglobin 10.9, hematocrit 34, platelets 128, glucose 89, creatinine 1.65, GFR 31, sodium 144, potassium 4.6, calcium 9.1, alkaline phosphatase 79, AST 17, ALT 15, phosphorus 4.9, cholesterol 60, triglycerides 104, HDL 52, LDL 89, hemoglobin A1c 6.3%, PTH 53      Current medications include aspirin 81 mg EC, Lipitor 10 mg 1/2 nightly, Eliquis 2.5 mg twice daily, vitamin C,FEBUXOSTAT 40 MG, Lasix 40 mg daily, losartan 100 mg daily,  metoprolol 25 mg daily,, Victoza 0.6 mg daily, vitamin B12 2500 mcg daily, vitamin D 1000 units daily, coenzyme Q10 daily          Review of Systems   Constitutional:  Positive for fatigue. Negative for chills and fever.   HENT:  Negative for ear pain, sinus pressure and sore throat.    Eyes:  Negative for blurred vision and double vision.   Respiratory:  Negative for cough, shortness of breath and wheezing.    Cardiovascular:  Negative for chest pain and palpitations.   Gastrointestinal:  Negative for abdominal pain, blood in stool, constipation, diarrhea, nausea and vomiting.   Skin:  Negative for rash.   Neurological:  Negative for dizziness and headache.   Psychiatric/Behavioral:  Negative for depressed mood.       Objective   Vital Signs:   /53   Pulse 63   Temp 97.2 °F (36.2 °C)   Resp 18   Wt 92.1 kg (203 lb)   SpO2 97%   BMI 33.27 kg/m²     Physical Exam  Vitals and nursing note reviewed.   Constitutional:       General: She is not in acute distress.     Appearance: Normal appearance. She is not ill-appearing, toxic-appearing or diaphoretic.   HENT:      Head: Normocephalic and atraumatic.      Right Ear: Tympanic membrane, ear canal and external ear normal.      Left Ear: Tympanic membrane, ear canal and external ear normal.      Nose: No congestion or rhinorrhea.      Mouth/Throat:      Mouth: Mucous membranes are moist.      Pharynx: Oropharynx is clear. No oropharyngeal exudate or posterior oropharyngeal erythema.   Eyes:      Extraocular Movements: Extraocular movements intact.      Conjunctiva/sclera: Conjunctivae normal.      Pupils: Pupils are equal, round, and reactive to light.   Cardiovascular:      Rate and Rhythm: Normal rate and regular rhythm.      Pulses:           Dorsalis pedis pulses are 1+ on the right side and 1+ on the left side.      Heart sounds: Murmur heard.   Systolic murmur is present with a grade of 4/6.   Pulmonary:      Effort: Pulmonary effort is normal.       Breath sounds: Normal breath sounds. No wheezing, rhonchi or rales.   Abdominal:      General: Abdomen is flat.      Palpations: Abdomen is soft.   Musculoskeletal:      Cervical back: Neck supple. No rigidity.      Right lower leg: 3+ Edema present.      Left lower leg: 3+ Edema present.      Right foot: No deformity or bunion.      Left foot: Deformity and bunion present.   Feet:      Right foot:      Protective Sensation: 7 sites tested.  2 sites sensed.      Skin integrity: Skin integrity normal. No ulcer or blister.      Toenail Condition: Right toenails are normal.      Left foot:      Protective Sensation: 7 sites tested.  0 sites sensed.      Skin integrity: Ulcer and erythema present. No blister.      Toenail Condition: Left toenails are normal.      Comments: Diabetic Foot Exam Performed and Monofilament Test Performed       L foot ulcer seen via picture taken today is C/D/I and healing nicely, mild erythema around the open wound approx 1 cm in size  Lymphadenopathy:      Cervical: No cervical adenopathy.   Skin:     General: Skin is warm and dry.   Neurological:      Mental Status: She is alert and oriented to person, place, and time.   Psychiatric:         Mood and Affect: Mood normal.         Behavior: Behavior normal.      Result Review :   The following data was reviewed by: Rosalind Blake MD on 12/07/2022:                          Assessment and Plan    Diagnoses and all orders for this visit:    1. Type 2 diabetes mellitus with diabetic neuropathy, without long-term current use of insulin (Primary)    2. Other iron deficiency anemia    3. Benign essential hypertension    4. Chronic coronary artery disease    5. Essential tremor    6. Paroxysmal atrial fibrillation    7. Cardiac pacemaker in situ    8. Aortic valve stenosis, etiology of cardiac valve disease unspecified    9. Peripheral vascular disease    10. Pure hypercholesterolemia    11. Seasonal allergic rhinitis due to pollen    12.  Diabetic ulcer of left midfoot associated with type 2 diabetes mellitus, limited to breakdown of skin    13. Polyneuropathy associated with underlying disease    14. Stage 3b chronic kidney disease      Sister Armen is overall doing well.  Her diabetes is very well controlled with a good hemoglobin A1c.  She is currently on Victoza and tolerating medication well.  She was on iron transfusions as needed and sees oncology/hematology for iron deficiency and I will recheck labs with iron levels.  She has underlying hypertension which is stable on current medication.  She has stage 3 kidney disease and she sees nephrology for this and is stable and under good control.  She is to avoid NSAIDs and push fluids 64 ounces a day.  Her essential tremor is stable.  She has paroxysmal A-fib with a pacemaker in place and aortic valve stenosis and denies chest pain, shortness of air or lower extremity edema at this time.  Her cholesterol is well controlled on atorvastatin.  Her seasonal allergies are stable without medication at this time.  She does have significant peripheral neuropathy pain in her feet bilaterally and a diabetic foot ulcer and she is under management with podiatry at this time    Follow Up   Return in about 3 months (around 9/7/2023) for Recheck.

## 2023-08-23 ENCOUNTER — NURSING HOME (OUTPATIENT)
Dept: FAMILY MEDICINE CLINIC | Age: 83
End: 2023-08-23
Payer: MEDICARE

## 2023-08-23 VITALS
OXYGEN SATURATION: 97 % | BODY MASS INDEX: 31.33 KG/M2 | TEMPERATURE: 97.7 F | SYSTOLIC BLOOD PRESSURE: 130 MMHG | RESPIRATION RATE: 16 BRPM | WEIGHT: 191.2 LBS | DIASTOLIC BLOOD PRESSURE: 47 MMHG | HEART RATE: 73 BPM

## 2023-08-23 DIAGNOSIS — N18.4 STAGE 4 CHRONIC KIDNEY DISEASE: Primary | ICD-10-CM

## 2023-08-23 DIAGNOSIS — E08.621 DIABETIC ULCER OF LEFT FOOT ASSOCIATED WITH DIABETES MELLITUS DUE TO UNDERLYING CONDITION, LIMITED TO BREAKDOWN OF SKIN, UNSPECIFIED PART OF FOOT: ICD-10-CM

## 2023-08-23 DIAGNOSIS — Z95.0 CARDIAC PACEMAKER IN SITU: ICD-10-CM

## 2023-08-23 DIAGNOSIS — I10 BENIGN ESSENTIAL HYPERTENSION: ICD-10-CM

## 2023-08-23 DIAGNOSIS — E11.40 TYPE 2 DIABETES MELLITUS WITH DIABETIC NEUROPATHY, WITHOUT LONG-TERM CURRENT USE OF INSULIN: ICD-10-CM

## 2023-08-23 DIAGNOSIS — L05.91 PILONIDAL CYST: ICD-10-CM

## 2023-08-23 DIAGNOSIS — L97.521 DIABETIC ULCER OF LEFT FOOT ASSOCIATED WITH DIABETES MELLITUS DUE TO UNDERLYING CONDITION, LIMITED TO BREAKDOWN OF SKIN, UNSPECIFIED PART OF FOOT: ICD-10-CM

## 2023-08-23 DIAGNOSIS — I48.0 PAROXYSMAL ATRIAL FIBRILLATION: ICD-10-CM

## 2023-08-23 DIAGNOSIS — M1A.3790 CHRONIC GOUT DUE TO RENAL IMPAIRMENT INVOLVING TOE WITHOUT TOPHUS, UNSPECIFIED LATERALITY: ICD-10-CM

## 2023-08-23 PROCEDURE — 99349 HOME/RES VST EST MOD MDM 40: CPT | Performed by: FAMILY MEDICINE

## 2023-08-23 NOTE — PROGRESS NOTES
Yola Dorman presents for an evaluation physician visit at the [facility] [place]    Chief Complaint: worsening kidney failure with high BUN      Subjective      History of Present Illness  She presents with high BUN and worsening renal function, was sent to ER last week for IVF-she got 1 liter and then was wanted to go home, the ER doctor wanted to keep her overnight.  She feels tired and weak, s/p URI cold with ongoing mild symptoms of runny nose and sinus congestion, Flu test was neg in ER, CXR neg, urinalysis neg.  4 covid tests done, all normal.  She is on losartan, uloric and lasix which can all affect her renal function.  She has diabetes that is very well controlled on victoza:Blood sugars are 115/111/103/ 109/ 110/121/109/121/112/ 100/119/ 115/111/89/94/ 112/120/114.  She sees nephrologist Dr Bennett.      Most recent labs: 8/18/2023: Sodium 140, potassium Ramona, calcium 9.1, , creatinine 2.2, glucose 162, albumin 3.5, alkaline phosphatase 95, ALT 20, AST 17, GFR 22 WBC 8.0, hemoglobin 10.5, hematocrit 32.9, platelets 146    Current med list includes Victoza 0.6 mg daily, vitamin B12 2500 mcg daily, vitamin D 1000 units daily cetirizine 10 mg as needed, Lasix 40 mg as needed weight gain greater than 5 pounds in a week, loratadine 10 milligrams as needed aspirin 81 mg daily, or statin milligrams every morning, coenzyme every 10, HCTZ 12.5 mg daily Eliquis 2.5 mg twice daily, vitamin C 500 mg daily, Uloric 40 mg daily, Lasix 40 mg daily, losartan 100 mg daily, metoprolol 25 mg daily and Ocean nasal spray       Objective   Vital Signs:   /47   Pulse 73   Temp 97.7 øF (36.5 øC)   Resp 16   Wt 86.7 kg (191 lb 3.2 oz)   SpO2 97%   BMI 31.33 kg/mý     Physical Exam  Constitutional:       General: She is not in acute distress.     Appearance: She is normal weight. She is not ill-appearing.   HENT:      Head: Normocephalic.      Right Ear: Tympanic membrane normal. There is no  impacted cerumen.      Left Ear: Tympanic membrane normal. There is no impacted cerumen.      Mouth/Throat:      Mouth: Mucous membranes are moist.      Pharynx: Oropharynx is clear.   Eyes:      Extraocular Movements: Extraocular movements intact.      Pupils: Pupils are equal, round, and reactive to light.   Neck:      Vascular: No carotid bruit.   Cardiovascular:      Rate and Rhythm: Normal rate and regular rhythm.      Pulses: Normal pulses.      Heart sounds: No murmur heard.  Pulmonary:      Effort: Pulmonary effort is normal.      Breath sounds: No wheezing, rhonchi or rales.   Abdominal:      General: Bowel sounds are normal.      Palpations: Abdomen is soft.      Tenderness: There is no abdominal tenderness.   Musculoskeletal:         General: No swelling. Normal range of motion.      Cervical back: No rigidity or tenderness.   Lymphadenopathy:      Cervical: No cervical adenopathy.   Skin:     General: Skin is warm and dry.          Neurological:      Mental Status: She is alert.      Motor: Weakness present.      Gait: Gait abnormal.      Comments: slowed and shuffling gait   Psychiatric:         Mood and Affect: Mood normal.         Behavior: Behavior normal.         Judgment: Judgment normal.     BMI is >= 30 and <35. (Class 1 Obesity). The following options were offered after discussion;: exercise counseling/recommendations and nutrition counseling/recommendations          Assessment and Plan   Diagnoses and all orders for this visit:    1. Stage 4 chronic kidney disease (Primary)  Comments:  referral back to Dr Bennett, decrease losartan to 50 mg, lasix to 20 mg and uloric to QOD and recheck labs 1 week, push fluids 64 oz daily    2. Type 2 diabetes mellitus with diabetic neuropathy, without long-term current use of insulin  Comments:  Under terrific control with Victoza.  Will not change medication at this time.  May hold Victoza if kidney function is not improved in 1 week    3. Benign essential  hypertension  Comments:  These are stable and well-controlled.  We will decrease losartan to 50 mg daily due to worsening renal function    4. Paroxysmal atrial fibrillation  Comments:  Dewayne, in NSR today.  She has a pacemaker.  We will follow-up with cardiology as directed and continue current meds including metoprolol and Eliquis    5. Diabetic ulcer of left foot associated with diabetes mellitus due to underlying condition, limited to breakdown of skin, unspecified part of foot  Comments:  Continue close follow-up with podiatry who is managing her chronic ulcer    6. Cardiac pacemaker in situ    7. Pilonidal cyst  Comments:  Benign.  Patient reassured.  Encouraged to stop messing with it    8. 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 This time includes time spent by me in the following activities:reviewing tests, obtaining and/or reviewing a separately obtained history, performing a medically appropriate examination and/or evaluation , counseling and educating the patient/family/caregiver, ordering medications, tests, or procedures, referring and communicating with other health care professionals , documenting information in the medical record, independently interpreting results and communicating that information with the patient/family/caregiver, and care coordination  Follow Up   Return in about 1 month (around 9/23/2023), or if symptoms worsen or fail to improve, for Recheck.    Review of Systems   Constitutional:  Positive for fatigue. Negative for chills and fever.   HENT:  Negative for ear pain, sinus pressure and sore throat.    Eyes:  Negative for blurred vision and double vision.   Respiratory:  Negative for cough, shortness of breath and wheezing.    Cardiovascular:  Negative for chest pain and  palpitations.   Gastrointestinal:  Negative for abdominal pain, blood in stool, constipation, diarrhea, nausea and vomiting.   Skin:  Negative for rash.   Neurological:  Negative for dizziness, headache and confusion.   Psychiatric/Behavioral:  Negative for sleep disturbance, suicidal ideas and depressed mood. The patient is not nervous/anxious.       Result Review   The following data was reviewed by Rosalind Blake MD on 08/23/2023.  Lab Results   Component Value Date    WBC 8.16 05/01/2017    HGB 9.4 (L) 05/01/2017    HCT 31.0 (L) 05/01/2017    MCV 88.1 05/01/2017     05/01/2017     Lab Results   Component Value Date    GLUCOSE 126 (H) 04/27/2017    BUN 88 (H) 04/27/2017    CREATININE 1.87 (H) 04/27/2017    BCR 47.1 (H) 04/27/2017    K 4.6 04/27/2017    CO2 26.8 04/27/2017    CALCIUM 9.4 04/27/2017    PROTENTOTREF 6.9 04/27/2017    ALBUMIN 4.30 04/27/2017    BILITOT 0.5 04/27/2017    AST 14 04/27/2017    ALT 13 04/27/2017     Lab Results   Component Value Date    CHLPL 123 04/27/2017    TRIG 152 (H) 04/27/2017    HDL 36 (L) 04/27/2017    LDL 57 04/27/2017     No results found for: TSH  No results found for: HGBA1C  No results found for: PSA    Part of this note may be an electronic transcription/translation of spoken language to printed   text using the Dragon Dictation System.

## 2023-11-27 RX ORDER — HYDROCHLOROTHIAZIDE 12.5 MG/1
12.5 TABLET ORAL DAILY
Qty: 30 TABLET | Refills: 0 | Status: SHIPPED | OUTPATIENT
Start: 2023-11-27

## 2023-12-13 ENCOUNTER — NURSING HOME (OUTPATIENT)
Dept: FAMILY MEDICINE CLINIC | Age: 83
End: 2023-12-13
Payer: MEDICARE

## 2023-12-13 VITALS
BODY MASS INDEX: 33.07 KG/M2 | HEART RATE: 66 BPM | TEMPERATURE: 97.2 F | OXYGEN SATURATION: 98 % | RESPIRATION RATE: 16 BRPM | DIASTOLIC BLOOD PRESSURE: 56 MMHG | WEIGHT: 201.8 LBS | SYSTOLIC BLOOD PRESSURE: 181 MMHG

## 2023-12-13 DIAGNOSIS — M1A.3790 CHRONIC GOUT DUE TO RENAL IMPAIRMENT INVOLVING TOE WITHOUT TOPHUS, UNSPECIFIED LATERALITY: ICD-10-CM

## 2023-12-13 DIAGNOSIS — D50.8 OTHER IRON DEFICIENCY ANEMIA: ICD-10-CM

## 2023-12-13 DIAGNOSIS — Z12.11 COLON CANCER SCREENING: ICD-10-CM

## 2023-12-13 DIAGNOSIS — N18.32 STAGE 3B CHRONIC KIDNEY DISEASE: ICD-10-CM

## 2023-12-13 DIAGNOSIS — Z12.31 ENCOUNTER FOR SCREENING MAMMOGRAM FOR BREAST CANCER: ICD-10-CM

## 2023-12-13 DIAGNOSIS — M25.521 ELBOW PAIN, RIGHT: ICD-10-CM

## 2023-12-13 DIAGNOSIS — I25.10 CHRONIC CORONARY ARTERY DISEASE: ICD-10-CM

## 2023-12-13 DIAGNOSIS — G63 POLYNEUROPATHY ASSOCIATED WITH UNDERLYING DISEASE: ICD-10-CM

## 2023-12-13 DIAGNOSIS — I35.0 AORTIC VALVE STENOSIS, ETIOLOGY OF CARDIAC VALVE DISEASE UNSPECIFIED: ICD-10-CM

## 2023-12-13 DIAGNOSIS — I10 BENIGN ESSENTIAL HYPERTENSION: ICD-10-CM

## 2023-12-13 DIAGNOSIS — I48.0 PAROXYSMAL ATRIAL FIBRILLATION: ICD-10-CM

## 2023-12-13 DIAGNOSIS — Z00.00 ENCOUNTER FOR ANNUAL WELLNESS EXAM IN MEDICARE PATIENT: Primary | ICD-10-CM

## 2023-12-13 DIAGNOSIS — L97.521 DIABETIC ULCER OF LEFT FOOT ASSOCIATED WITH DIABETES MELLITUS DUE TO UNDERLYING CONDITION, LIMITED TO BREAKDOWN OF SKIN, UNSPECIFIED PART OF FOOT: ICD-10-CM

## 2023-12-13 DIAGNOSIS — Z78.0 POSTMENOPAUSAL STATE: ICD-10-CM

## 2023-12-13 DIAGNOSIS — Z95.0 CARDIAC PACEMAKER IN SITU: ICD-10-CM

## 2023-12-13 DIAGNOSIS — Z23 ENCOUNTER FOR IMMUNIZATION: ICD-10-CM

## 2023-12-13 DIAGNOSIS — R29.898 LEG WEAKNESS, BILATERAL: ICD-10-CM

## 2023-12-13 DIAGNOSIS — E78.00 PURE HYPERCHOLESTEROLEMIA: ICD-10-CM

## 2023-12-13 DIAGNOSIS — M20.001 ACQUIRED DEFORMITY OF JOINT OF FINGER OF RIGHT HAND: ICD-10-CM

## 2023-12-13 DIAGNOSIS — J30.1 SEASONAL ALLERGIC RHINITIS DUE TO POLLEN: ICD-10-CM

## 2023-12-13 DIAGNOSIS — M72.0 DUPUYTREN CONTRACTURE: ICD-10-CM

## 2023-12-13 DIAGNOSIS — E08.621 DIABETIC ULCER OF LEFT FOOT ASSOCIATED WITH DIABETES MELLITUS DUE TO UNDERLYING CONDITION, LIMITED TO BREAKDOWN OF SKIN, UNSPECIFIED PART OF FOOT: ICD-10-CM

## 2023-12-13 DIAGNOSIS — E11.40 TYPE 2 DIABETES MELLITUS WITH DIABETIC NEUROPATHY, WITHOUT LONG-TERM CURRENT USE OF INSULIN: ICD-10-CM

## 2023-12-13 DIAGNOSIS — I73.9 PERIPHERAL VASCULAR DISEASE: ICD-10-CM

## 2023-12-13 NOTE — PROGRESS NOTES
PORFIRIO QUILES VISIT    The ABCs of the Annual Wellness Visit  Subsequent Medicare Wellness Visit    Subjective    Yola Dorman is a 83 y.o. female who presents for a Subsequent Medicare Wellness Visit.    The following portions of the patient's history were reviewed and   updated as appropriate: allergies, current medications, past family history, past medical history, past social history, past surgical history, and problem list.    Compared to one year ago, the patient feels her physical   health is worse with progessive weakness and lower extremity pain in posterior LE.     Compared to one year ago, the patient feels her mental   health is the same.    Recent Hospitalizations:  She was not admitted to the hospital during the last year.       Advance Care Planning  Advance Directive is not on file.  ACP discussion was held with the patient during this visit. Patient has an advance directive (not in EMR), copy requested.    Does the patient have evidence of cognitive impairment? No    Aspirin is on active medication list. Aspirin use is indicated based on review of current medical condition/s. Pros and cons of this therapy have been discussed today. Benefits of this medication outweigh potential harm.  Patient has been encouraged to continue taking this medication.  .      Patient Active Problem List   Diagnosis    Sleep apnea    Hyperlipidemia    Diabetes mellitus    Arthritis    Allergic    Cataract    Anemia    Aortic valve stenosis    Atrioventricular block    Benign essential hypertension    Chronic coronary artery disease    Presence of cardiac pacemaker    Renal insufficiency    Sick sinus syndrome    Allergic rhinitis    Atrial fibrillation    Atherosclerosis of both carotid arteries    Gout    History of cardiomyopathy    Peripheral vascular disease    Ischemic heart disease    Osteoporosis    Stage 4 chronic kidney disease    Anemia    Type 2 diabetes mellitus    Hyperlipidemia    Cardiac  "pacemaker in situ    Obstructive sleep apnea       Current Medical Providers:  Patient Care Team:  Rosalind Blake MD as PCP - General (Family Medicine)  Logan Lopez APRN as Nurse Practitioner (Nurse Practitioner)  Jose Brice APRN (Family Medicine)  Krzysztof Nicholas APRN (Nurse Practitioner)  Dayo Beck MD PhD as Consulting Physician (Ophthalmology)  Kanu Dewitt DPM as Consulting Physician (Podiatry)  Bryant Burnette MD (Hematology and Oncology)  Michael Cochran MD as Consulting Physician (Pulmonary Disease)  Kanu Bennett MD as Consulting Physician (Nephrology)    No opioid medication identified on active medication list. I have reviewed chart for other potential  high risk medication/s and harmful drug interactions in the elderly.             Objective    Vitals:    23 1103   BP: (!) 181/56   Pulse: 66   Resp: 16   Temp: 97.2 °F (36.2 °C)   SpO2: 98%   Weight: 91.5 kg (201 lb 12.8 oz)     Estimated body mass index is 33.07 kg/m² as calculated from the following:    Height as of 22: 166.4 cm (65.5\").    Weight as of this encounter: 91.5 kg (201 lb 12.8 oz).                     HEALTH RISK ASSESSMENT    Smoking Status:  Social History     Tobacco Use   Smoking Status Never   Smokeless Tobacco Never     Alcohol Consumption:  Social History     Substance and Sexual Activity   Alcohol Use No     Fall Risk Screen:    STEADI Fall Risk Assessment was completed, and patient is at HIGH risk for falls. Assessment completed on:2023    Depression Screenin/13/2023    10:00 AM   PHQ-2/PHQ-9 Depression Screening   Little Interest or Pleasure in Doing Things 0-->not at all   Feeling Down, Depressed or Hopeless 0-->not at all   PHQ-9: Brief Depression Severity Measure Score 0       Health Habits and Functional and Cognitive Screenin/13/2023    10:00 AM   Functional & Cognitive Status   Do you have difficulty preparing food and eating? No   Do " you have difficulty bathing yourself, getting dressed or grooming yourself? No   Do you have difficulty using the toilet? No   Do you have difficulty moving around from place to place? No   Do you have trouble with steps or getting out of a bed or a chair? No   Current Diet Well Balanced Diet   Dental Exam Up to date   Eye Exam Up to date   Exercise (times per week) 0 times per week   Do you need help using the phone?  No   Are you deaf or do you have serious difficulty hearing?  Yes   Do you need help to go to places out of walking distance? Yes   Do you need help shopping? Yes   Do you need help preparing meals?  Yes   Do you need help with housework?  Yes   Do you need help with laundry? Yes   Do you need help taking your medications? Yes   Do you need help managing money? Yes   Do you ever drive or ride in a car without wearing a seat belt? No   Have you felt unusual stress, anger or loneliness in the last month? No   Who do you live with? Community   If you need help, do you have trouble finding someone available to you? No   Have you been bothered in the last four weeks by sexual problems? No   Do you have difficulty concentrating, remembering or making decisions? No       Age-appropriate Screening Schedule:  Refer to the list below for future screening recommendations based on patient's age, sex and/or medical conditions. Orders for these recommended tests are listed in the plan section. The patient has been provided with a written plan.    Health Maintenance   Topic Date Due    URINE MICROALBUMIN  Never done    INFLUENZA VACCINE  08/01/2023    Pneumococcal Vaccine 65+ (2 - PPSV23 or PCV20) 08/12/2024 (Originally 12/11/2018)    HEMOGLOBIN A1C  02/29/2024    DIABETIC EYE EXAM  04/05/2024    LIPID PANEL  08/30/2024    ANNUAL WELLNESS VISIT  12/13/2024    DXA SCAN  12/22/2024    TDAP/TD VACCINES (2 - Td or Tdap) 06/21/2025    COLORECTAL CANCER SCREENING  02/17/2030    COVID-19 Vaccine  Completed    ZOSTER  VACCINE  Completed    MAMMOGRAM  Discontinued              Outpatient Medications Prior to Visit   Medication Sig Dispense Refill    Aspirin Low Dose 81 MG EC tablet GIVE 1 TABLET BY MOUTH AT BEDTIME 30 tablet 6    atorvastatin (LIPITOR) 10 MG tablet TAKE 1/2 TABLET (5mg) BY MOUTH EVERY MORNING 15 tablet 5    Bioflavonoid Products (PHOEBE C PO) Take  by mouth.      Bioflavonoid Products (Phoebe-C) tablet TAKE 1 TABLET BY MOUTH EVERY OTHER DAY 45 each 1    cholecalciferol (VITAMIN D3) 25 MCG (1000 UT) tablet TAKE 2 TABLETS ( 2000 UNITS) BY MOUTH EVERY MORNING 60 tablet 5    febuxostat (ULORIC) 80 MG tablet tablet Take 40 mg by mouth Daily.      Ferrous Sulfate (IRON) 325 (65 FE) MG tablet Take 1 tablet by mouth Daily.      furosemide (LASIX) 40 MG tablet Take 1 tablet BY MOUTH EVERY DAY 30 tablet 5    glucose blood (OneTouch Ultra) test strip Check blood sugar daily dx e11.9 100 each 5    hydroCHLOROthiazide (HYDRODIURIL) 12.5 MG tablet TAKE 1 TABLET BY MOUTH ONCE DAILY 30 tablet 0    insulin aspart (NovoLOG) 100 UNIT/ML injection Sliding Scale      Insulin Glargine (LANTUS SC) Inject 70 Units under the skin every night. Lantus OptiClik Cartridge 100 units/mL subcutaneous solution      Insulin Pen Needle (Pen Needles) 32G X 4 MM misc 1 each Daily. Use with vicotza dx e 11.59 100 each 3    Lancets (OneTouch Delica Plus [id number]) misc 1 each Daily. Dx e11.9 100 each 0    losartan (COZAAR) 100 MG tablet TK 1 T PO QD  3    metoprolol tartrate (LOPRESSOR) 25 MG tablet Take 25 mg by mouth 2 (Two) Times a Day.  3    Multiple Vitamins-Minerals (MULTIVITAMIN PO) Take 1 tablet by mouth daily.      nitroglycerin (NITROSTAT) 0.4 MG SL tablet Place 0.4 mg under the tongue As Needed.  5    Omega-3 Fatty Acids (FISH OIL) 1200 MG capsule delayed-release Take 1 tablet by mouth Daily.      ranibizumab (LUCENTIS) 0.3 MG/0.05ML injection 0.3 mg 1 (One) Time. Every 4 weeks.      Victoza 18 MG/3ML solution pen-injector injection INJECT  0.2ML (1.2MG) SUBCUTANEOUSLY EVERY MORNING AT 8AM 6 mL 5    vitamin B-12 (CYANOCOBALAMIN) 1000 MCG tablet Take 1,000 mcg by mouth Daily.      vitamin B-12 (CYANOCOBALAMIN) 2500 MCG sublingual tablet tablet PLACE 1 TABLET UNDER THE TONGUE EVERY MORNING 30 each 5    Vitamin Mixture (PHOEBE-C PO) Take 500 mg by mouth daily.       No facility-administered medications prior to visit.       CMS Preventative Services Quick Reference  Risk Factors Identified During Encounter  Hearing Problem:  she defers hearing testing or ENT referral today  Inactivity/Sedentary: Patient was advised to exercise at least 150 minutes a week per CDC recommendations.  Urinary Incontinence: Kegel exercises discussed  Dental Screening Recommended  Vision Screening Recommended  The above risks/problems have been discussed with the patient.  Pertinent information has been shared with the patient in the After Visit Summary.  An After Visit Summary and PPPS were made available to the patient.    Follow Up:   Next Medicare Wellness visit to be scheduled in 1 year.       Additional E&M Note during same encounter follows:  Patient has multiple medical problems which are significant and separately identifiable that require additional work above and beyond the Medicare Wellness Visit.         Yola Dorman presents to [facility].    Chief Complaint: Finger contractures and pain, med review and refills        Subjective   History of Present Illness:  HPI    Sister has a few concerns.  She has 2 fingers that are misbehaving.  She has a R hand middle finger that when she closes her hand she can't open up the finger and it snaps when it does open. Ring finger on her left hand is doing the same thins.  Sx onset 1-2 months ago.  H/O typing and now a lot of computer games    She also c/o R elbow pain-stinging pain, no pain at rest, tender with holding a tray or any use of it.    Stage 4 kidney disease, she sees Dr Jcakson  an upcoming appt next week.  H/o ER visit this fall and she needed IVFs.   She is on losartan, uloric and lasix which can all affect her renal function.        She has diabetes that is very well controlled on victoza with BS testing < 130    Most recent labs 11/8/23: WBC 6.9, hemoglobin 10.2, hematocrit 31.2, platelets 173, blood sugar 106, creatinine 1.68, GFR 30, sodium 136, potassium 4.8, calcium 9.2, alkaline phosphatase 87, AST 20, ALT 20, cholesterol 153, triglycerides 124, HDL 49, LDL 82, hemoglobin A1c 6.4%    Labs 12/11/2023: WBC 6, hemoglobin 9.8, hematocrit 30, platelets 134, blood sugar 101, creatinine 1.54, GFR 34, sodium 145, potassium 4.7, calcium 9.1, phosphorus 4.8, PTH 57, urinalysis negative with trace of WBC      Current med list includes Victoza 0.6 mg daily, vitamin B12 2500 mcg daily, vitamin D 1000 units daily,  cetirizine 10 mg as needed, Lasix 20 mg as needed weight gain greater than 5 pounds in a week, aspirin 81 mg daily, or statin milligrams every morning, coenzyme every 10, HCTZ 12.5 mg daily Eliquis 2.5 mg twice daily, vitamin C 500 mg daily, Uloric 40 mg daily, Lasix 20 mg daily, losartan 50 mg daily, HCTZ 12.5 mg daily, metoprolol 25 mg daily and Ocean nasal spray, uloric 40 mg daily, lipitor 10 mg nightly, coenzyme Q10, iron 324 mg M/W/F, meclizine 12.5 mg daily prn dizziness, ibuprofen 200 mg prn pain, tylenol prn                      Result Review   The following data was reviewed by Rosalind Blake MD on 12/13/2023.  Lab Results   Component Value Date    WBC 8.16 05/01/2017    HGB 9.4 (L) 05/01/2017    HCT 31.0 (L) 05/01/2017    MCV 88.1 05/01/2017     05/01/2017     Lab Results   Component Value Date    GLUCOSE 126 (H) 04/27/2017    BUN 88 (H) 04/27/2017    CREATININE 1.87 (H) 04/27/2017    BCR 47.1 (H) 04/27/2017    K 4.6 04/27/2017    CO2 26.8 04/27/2017    CALCIUM 9.4 04/27/2017    PROTENTOTREF 6.9 04/27/2017    ALBUMIN 4.30 04/27/2017    BILITOT 0.5 04/27/2017     "AST 14 04/27/2017    ALT 13 04/27/2017     Lab Results   Component Value Date    CHLPL 123 04/27/2017    TRIG 152 (H) 04/27/2017    HDL 36 (L) 04/27/2017    LDL 57 04/27/2017     No results found for: \"TSH\"  No results found for: \"HGBA1C\"  No results found for: \"PSA\"      No results found for: \"DVCU33GI\"          Assessment and Plan:   Diagnoses and all orders for this visit:    1. Encounter for annual wellness exam in Medicare patient (Primary)    2. Encounter for screening mammogram for breast cancer    3. Encounter for immunization    4. Colon cancer screening  Comments:  done due to age    5. Postmenopausal state  Comments:  dexa is UTD    6. Stage 4 chronic kidney disease    7. Type 2 diabetes mellitus with diabetic neuropathy, without long-term current use of insulin  Comments:  very well controlled, hgba1c 6.4%, cont low carb diet, cont podiatry eval for healing diab ulcer, cont yearly eye exams    8. Benign essential hypertension    9. Paroxysmal atrial fibrillation    10. Diabetic ulcer of left foot associated with diabetes mellitus due to underlying condition, limited to breakdown of skin, unspecified part of foot    11. 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 Cardiac pacemaker in situ    13. Other iron deficiency anemia    14. Chronic coronary artery disease    15. Essential tremor    16. Aortic valve stenosis, etiology of cardiac valve disease unspecified    17. Peripheral vascular disease    18. Pure hypercholesterolemia    19. Seasonal allergic rhinitis due to pollen    20. Polyneuropathy associated with underlying disease    21. Stage 3b chronic kidney disease    22. Dupuytren contracture  Comments:  start topical voltaren gel 1% bid and increased activity with hand    23. Elbow pain, right  Comments:  start a tennis elbow strap and topical voltaren gel 1% bid    24. Leg weakness, bilateral  Comments:  xray L spine, set her up with PT and will " give HA on chair exercises    25. 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                    Medications:      Current Outpatient Medications:     Aspirin Low Dose 81 MG EC tablet, GIVE 1 TABLET BY MOUTH AT BEDTIME, Disp: 30 tablet, Rfl: 6    atorvastatin (LIPITOR) 10 MG tablet, TAKE 1/2 TABLET (5mg) BY MOUTH EVERY MORNING, Disp: 15 tablet, Rfl: 5    Bioflavonoid Products (PHOEBE C PO), Take  by mouth., Disp: , Rfl:     Bioflavonoid Products (Phoebe-C) tablet, TAKE 1 TABLET BY MOUTH EVERY OTHER DAY, Disp: 45 each, Rfl: 1    cholecalciferol (VITAMIN D3) 25 MCG (1000 UT) tablet, TAKE 2 TABLETS ( 2000 UNITS) BY MOUTH EVERY MORNING, Disp: 60 tablet, Rfl: 5    febuxostat (ULORIC) 80 MG tablet tablet, Take 40 mg by mouth Daily., Disp: , Rfl:     Ferrous Sulfate (IRON) 325 (65 FE) MG tablet, Take 1 tablet by mouth Daily., Disp: , Rfl:     furosemide (LASIX) 40 MG tablet, Take 1 tablet BY MOUTH EVERY DAY, Disp: 30 tablet, Rfl: 5    glucose blood (OneTouch Ultra) test strip, Check blood sugar daily dx e11.9, Disp: 100 each, Rfl: 5    hydroCHLOROthiazide (HYDRODIURIL) 12.5 MG tablet, TAKE 1 TABLET BY MOUTH ONCE DAILY, Disp: 30 tablet, Rfl: 0    insulin aspart (NovoLOG) 100 UNIT/ML injection, Sliding Scale, Disp: , Rfl:     Insulin Glargine (LANTUS SC), Inject 70 Units under the skin every night. Lantus OptiClik Cartridge 100 units/mL subcutaneous solution, Disp: , Rfl:     Insulin Pen Needle (Pen Needles) 32G X 4 MM misc, 1 each Daily. Use with vicotza dx e 11.59, Disp: 100 each, Rfl: 3    Lancets (OneTouch Delica Plus [id number]) misc, 1 each Daily. Dx e11.9, Disp: 100 each, Rfl: 0    losartan (COZAAR) 100 MG tablet, TK 1 T PO QD, Disp: , Rfl: 3    metoprolol tartrate (LOPRESSOR) 25 MG tablet, Take 25 mg by mouth 2 (Two) Times a Day., Disp: , Rfl: 3    Multiple Vitamins-Minerals (MULTIVITAMIN PO), Take 1 tablet by mouth daily., Disp: , Rfl:     nitroglycerin (NITROSTAT) 0.4 MG SL tablet, Place 0.4 mg under the  tongue As Needed., Disp: , Rfl: 5    Omega-3 Fatty Acids (FISH OIL) 1200 MG capsule delayed-release, Take 1 tablet by mouth Daily., Disp: , Rfl:     ranibizumab (LUCENTIS) 0.3 MG/0.05ML injection, 0.3 mg 1 (One) Time. Every 4 weeks., Disp: , Rfl:     Victoza 18 MG/3ML solution pen-injector injection, INJECT 0.2ML (1.2MG) SUBCUTANEOUSLY EVERY MORNING AT 8AM, Disp: 6 mL, Rfl: 5    vitamin B-12 (CYANOCOBALAMIN) 1000 MCG tablet, Take 1,000 mcg by mouth Daily., Disp: , Rfl:     vitamin B-12 (CYANOCOBALAMIN) 2500 MCG sublingual tablet tablet, PLACE 1 TABLET UNDER THE TONGUE EVERY MORNING, Disp: 30 each, Rfl: 5    Vitamin Mixture (PHOEBE-C PO), Take 500 mg by mouth daily., Disp: , Rfl:        Objective   Vital Signs:   BP (!) 181/56   Pulse 66   Temp 97.2 °F (36.2 °C)   Resp 16   Wt 91.5 kg (201 lb 12.8 oz)   SpO2 98%   BMI 33.07 kg/m²       Physical Exam:  Physical Exam  Constitutional:       General: She is not in acute distress.     Appearance: She is normal weight. She is not ill-appearing.   HENT:      Head: Normocephalic.      Right Ear: Tympanic membrane normal. There is no impacted cerumen.      Left Ear: Tympanic membrane normal. There is no impacted cerumen.      Mouth/Throat:      Mouth: Mucous membranes are moist.      Pharynx: Oropharynx is clear.   Eyes:      Extraocular Movements: Extraocular movements intact.      Pupils: Pupils are equal, round, and reactive to light.   Neck:      Vascular: No carotid bruit.   Cardiovascular:      Rate and Rhythm: Normal rate and regular rhythm.      Pulses: Normal pulses.           Dorsalis pedis pulses are 2+ on the right side and 2+ on the left side.      Heart sounds: Murmur heard.      Systolic murmur is present with a grade of 4/6.   Pulmonary:      Effort: Pulmonary effort is normal.      Breath sounds: No wheezing, rhonchi or rales.   Chest:   Breasts:     Right: Normal.      Left: Normal.   Abdominal:      General: Bowel sounds are normal.      Palpations:  Abdomen is soft.      Tenderness: There is no abdominal tenderness.   Musculoskeletal:         General: No swelling. Normal range of motion.      Right hand: No swelling. Decreased range of motion. Decreased strength. Normal capillary refill. Normal pulse.      Left hand: No swelling. Decreased range of motion. Decreased strength. Normal capillary refill. Normal pulse.      Cervical back: No rigidity or tenderness.      Comments: Dupuytren contracture noted hands B, mild lateral deviation MCPs B   Feet:      Right foot:      Protective Sensation: 7 sites tested.  0 sites sensed.      Skin integrity: Skin integrity normal. No ulcer or blister.      Left foot:      Protective Sensation: 7 sites tested.  0 sites sensed.      Skin integrity: Ulcer present. No blister.      Comments:     Ulcer bottom ball of left foot 1 cm, healing, no si of  infection  Lymphadenopathy:      Cervical: No cervical adenopathy.   Skin:     General: Skin is warm and dry.   Neurological:      Mental Status: She is alert.      Gait: Gait normal.   Psychiatric:         Mood and Affect: Mood normal.         Behavior: Behavior normal.         Judgment: Judgment normal.                     Review of Systems:  Review of Systems   Constitutional:  Negative for chills, fatigue and fever.   HENT:  Negative for ear pain, sinus pressure and sore throat.    Eyes:  Negative for blurred vision and double vision.   Respiratory:  Negative for cough, shortness of breath and wheezing.    Cardiovascular:  Negative for chest pain and palpitations.   Gastrointestinal:  Negative for abdominal pain, blood in stool, constipation, diarrhea, nausea and vomiting.   Skin:  Negative for rash.   Neurological:  Negative for dizziness and headache.   Psychiatric/Behavioral:  Negative for sleep disturbance, suicidal ideas and depressed mood. The patient is not nervous/anxious.             Follow Up   Return in about 3 months (around 3/13/2024) for Recheck.    Part of this  note may be an electronic transcription/translation of spoken language to printed   text using the Dragon Dictation System.            Medical History:  Past Medical History:    Allergic    Arthritis    Cataract    CPAP (continuous positive airway pressure) dependence    Diabetes mellitus    Hyperlipidemia    Hypertension    Shingles    Skin cancer, basal cell    Sleep apnea     Past Surgical History:    APPENDECTOMY    CHOLECYSTECTOMY    CORONARY ANGIOPLASTY    CORONARY ARTERY BYPASS GRAFT    EYE SURGERY    FOOT WOUND CLOSURE    HYSTERECTOMY    INCISION AND DRAINAGE FOOT    INCISION AND DRAINAGE SHOULDER    PACEMAKER IMPLANTATION    SKIN CANCER EXCISION    basil cell     TOE AMPUTATION    TONSILLECTOMY      Family History   Problem Relation Age of Onset    COPD Mother     Cancer Father     Alcohol abuse Sister     Cancer Sister         neuroendocrine cancer    Alcohol abuse Brother     Hypertension Brother     Cancer Paternal Aunt     Cancer Paternal Uncle     Alcohol abuse Brother     Hearing loss Brother     Hearing loss Brother     Kidney cancer Brother     Hypertension Sister     Liver cancer Sister     Cancer Sister         neuroendocrine cancer    Hypothyroidism Sister     Liver cancer Sister     Cancer Paternal Aunt     Cancer Paternal Aunt     Cancer Paternal Aunt     Cancer Paternal Uncle     Cancer Paternal Uncle     Cancer Paternal Uncle      Social History     Tobacco Use    Smoking status: Never    Smokeless tobacco: Never   Substance Use Topics    Alcohol use: No       Health Maintenance Due   Topic Date Due    URINE MICROALBUMIN  Never done    INFLUENZA VACCINE  08/01/2023        Immunization History   Administered Date(s) Administered    COVID-19 (MODERNA) Monovalent Original Booster 04/26/2022    COVID-19 (PFIZER) BIVALENT 12+YRS 09/30/2022, 05/17/2023    COVID-19 (UNSPECIFIED) 10/22/2021    COVID-19 F23 (PFIZER) 12YRS+ (COMIRNATY) 10/25/2023    Fluzone High Dose =>65 Years ([employer] ONLY)  "10/25/2021    Hepatitis A 06/08/2018    Influenza, Unspecified 10/16/2018, 10/07/2021    Pneumococcal Conjugate 13-Valent (PCV13) 10/16/2018    Shingrix 01/19/2022, 04/26/2022       Allergies   Allergen Reactions    Sulfa Antibiotics      Cardiac Arrest.    Allopurinol      \"Red Man Face.\"    Amoxicillin Rash    Vancomycin Rash      "

## 2024-02-13 DIAGNOSIS — U07.1 COVID-19 VIRUS DETECTED: Primary | ICD-10-CM

## 2024-02-13 RX ORDER — NIRMATRELVIR AND RITONAVIR 150-100 MG
2 KIT ORAL 2 TIMES DAILY
Qty: 20 TABLET | Refills: 0 | Status: SHIPPED | OUTPATIENT
Start: 2024-02-13 | End: 2024-02-14 | Stop reason: SDUPTHER

## 2024-02-14 ENCOUNTER — NURSING HOME (OUTPATIENT)
Dept: FAMILY MEDICINE CLINIC | Age: 84
End: 2024-02-14
Payer: MEDICARE

## 2024-02-14 VITALS
TEMPERATURE: 97.3 F | DIASTOLIC BLOOD PRESSURE: 42 MMHG | BODY MASS INDEX: 33.07 KG/M2 | HEART RATE: 66 BPM | SYSTOLIC BLOOD PRESSURE: 124 MMHG | OXYGEN SATURATION: 94 % | RESPIRATION RATE: 18 BRPM | WEIGHT: 201.8 LBS

## 2024-02-14 DIAGNOSIS — U07.1 COVID-19 VIRUS DETECTED: Primary | ICD-10-CM

## 2024-02-14 RX ORDER — NIRMATRELVIR AND RITONAVIR 150-100 MG
2 KIT ORAL 2 TIMES DAILY
Qty: 20 TABLET | Refills: 0 | Status: SHIPPED | OUTPATIENT
Start: 2024-02-14

## 2024-03-04 ENCOUNTER — TELEPHONE (OUTPATIENT)
Dept: FAMILY MEDICINE CLINIC | Age: 84
End: 2024-03-04
Payer: MEDICARE

## 2024-03-04 NOTE — TELEPHONE ENCOUNTER
Anna called to let you know that patient is in need of CPAP machine and a request will be sent over and to please be on the look out for it.

## 2024-03-08 ENCOUNTER — TELEPHONE (OUTPATIENT)
Dept: FAMILY MEDICINE CLINIC | Age: 84
End: 2024-03-08
Payer: MEDICARE

## 2024-03-08 RX ORDER — LIRAGLUTIDE 6 MG/ML
INJECTION SUBCUTANEOUS
Qty: 6 ML | Refills: 5 | Status: CANCELLED | OUTPATIENT
Start: 2024-03-08

## 2024-03-08 NOTE — TELEPHONE ENCOUNTER
Caller: [facility]    Relationship: Other    Best call back number: [phone]    Requested Prescriptions:   Requested Prescriptions     Pending Prescriptions Disp Refills    Liraglutide (Victoza) 18 MG/3ML solution pen-injector injection 6 mL 5        Pharmacy where request should be sent: [facility] [address] [place] [facility] [phone] [facility] [phone] FX     Last office visit with prescribing clinician: Visit date not found   Last telemedicine visit with prescribing clinician: Visit date not found   Next office visit with prescribing clinician: Visit date not found     Additional details provided by patient: PHARMACY HAS PRESCRIPTION PRIOR AUTHORIZATION NEEDED IN ORDER TO FILL IT OR AN ALTERNATIVE MEDICATION PRESCRIBED  CALLER DID REQUEST TO BE KEPT UPDATED ON THIS     Does the patient have less than a 3 day supply:  [] Yes  [x] No    Would you like a call back once the refill request has been completed: [] Yes [x] No    If the office needs to give you a call back, can they leave a voicemail: [] Yes [x] No    Francis Witt Rep   03/08/24 13:13 EST

## 2024-03-13 ENCOUNTER — NURSING HOME (OUTPATIENT)
Dept: FAMILY MEDICINE CLINIC | Age: 84
End: 2024-03-13
Payer: MEDICARE

## 2024-03-13 VITALS
DIASTOLIC BLOOD PRESSURE: 46 MMHG | HEART RATE: 72 BPM | RESPIRATION RATE: 14 BRPM | TEMPERATURE: 97.3 F | BODY MASS INDEX: 33.82 KG/M2 | SYSTOLIC BLOOD PRESSURE: 140 MMHG | WEIGHT: 203 LBS | HEIGHT: 65 IN

## 2024-03-13 DIAGNOSIS — M1A.3790 CHRONIC GOUT DUE TO RENAL IMPAIRMENT INVOLVING TOE WITHOUT TOPHUS, UNSPECIFIED LATERALITY: ICD-10-CM

## 2024-03-13 DIAGNOSIS — N18.4 STAGE 4 CHRONIC KIDNEY DISEASE: ICD-10-CM

## 2024-03-13 DIAGNOSIS — E11.40 TYPE 2 DIABETES MELLITUS WITH DIABETIC NEUROPATHY, WITHOUT LONG-TERM CURRENT USE OF INSULIN: Primary | ICD-10-CM

## 2024-03-13 DIAGNOSIS — G25.0 ESSENTIAL TREMOR: ICD-10-CM

## 2024-03-13 DIAGNOSIS — E08.621 DIABETIC ULCER OF LEFT FOOT ASSOCIATED WITH DIABETES MELLITUS DUE TO UNDERLYING CONDITION, LIMITED TO BREAKDOWN OF SKIN, UNSPECIFIED PART OF FOOT: ICD-10-CM

## 2024-03-13 DIAGNOSIS — L97.521 DIABETIC ULCER OF LEFT FOOT ASSOCIATED WITH DIABETES MELLITUS DUE TO UNDERLYING CONDITION, LIMITED TO BREAKDOWN OF SKIN, UNSPECIFIED PART OF FOOT: ICD-10-CM

## 2024-03-13 DIAGNOSIS — E78.00 PURE HYPERCHOLESTEROLEMIA: ICD-10-CM

## 2024-03-13 DIAGNOSIS — Z95.0 CARDIAC PACEMAKER IN SITU: ICD-10-CM

## 2024-03-13 DIAGNOSIS — G63 POLYNEUROPATHY ASSOCIATED WITH UNDERLYING DISEASE: ICD-10-CM

## 2024-03-13 DIAGNOSIS — D50.8 OTHER IRON DEFICIENCY ANEMIA: ICD-10-CM

## 2024-03-13 DIAGNOSIS — I73.9 PERIPHERAL VASCULAR DISEASE: ICD-10-CM

## 2024-03-13 DIAGNOSIS — I10 BENIGN ESSENTIAL HYPERTENSION: ICD-10-CM

## 2024-03-13 DIAGNOSIS — I48.0 PAROXYSMAL ATRIAL FIBRILLATION: ICD-10-CM

## 2024-03-13 RX ORDER — LOSARTAN POTASSIUM 100 MG/1
50 TABLET ORAL DAILY
Start: 2024-03-13

## 2024-03-13 NOTE — PROGRESS NOTES
"[facility] [place]    Chief Complaint: low hemoglobin, bp concerns      Subjective      History of Present Illness    She sees Dr Burnette for her anemia which is getting worse.  Labs show a drop in her hgb from 31.7 in Feb 20 and dropped to 28.4, with iron 38, she is on iron infusions, vit B12 >2000, ferritin 828, retic 1.9, TIBC 200, platelets 152, blood sugar 110, creatinine 1.71, GFR 30, protein 5.9, calcium 8.9, sodium 144, potassium 4.4, alkaline phosphatase 83, AST 11 and ALT 16       She sees Dr Bennett for her stage 4 kidney disease   She is on losartan, uloric and lasix which can all affect her renal function.       Chronic allergies are stable, not on any meds     She presents for f/u for her hypertension with cardiomyopathy, she has a pacemaker. She is on metoprolol and eliquis, sees cardiologist Jose Brice.  BP is well controlled on losartan 100 mg daily and metoprolol 50 mg ER, tolerates meds well .  She has concerns about her medicine being held due waiting for blood pressure and pulse checks.      Her poor balance is ongoing, she has a rollator and now uses a motorized wheelchair, she denies feelings of vibration or dizzy spells     Vitamin D def, stable on vitamin D supplementation     She presents for her type 2 diabetes and she is stable and well controlled on Victoza, she tolerates med well  She tests her BS daily, running < 100 consistently, no hypoglycemia. She is stable and well controlled on current medications. hgba1c was 6.8%,  No foot issues.  Last eye exam: She gets these performed yearly            Objective   Vital Signs:   /46   Pulse 72   Temp 97.3 °F (36.3 °C)   Resp 14   Ht 165.1 cm (65\")   Wt 92.1 kg (203 lb)   BMI 33.78 kg/m²     Physical Exam  Constitutional:       General: She is not in acute distress.     Appearance: She is normal weight. She is not ill-appearing.   HENT:      Head: " Normocephalic.      Right Ear: Tympanic membrane normal. There is no impacted cerumen.      Left Ear: Tympanic membrane normal. There is no impacted cerumen.      Mouth/Throat:      Mouth: Mucous membranes are moist.      Pharynx: Oropharynx is clear.   Eyes:      Extraocular Movements: Extraocular movements intact.      Pupils: Pupils are equal, round, and reactive to light.   Neck:      Vascular: No carotid bruit.   Cardiovascular:      Rate and Rhythm: Normal rate and regular rhythm.      Pulses: Normal pulses.           Dorsalis pedis pulses are 2+ on the right side and 2+ on the left side.      Heart sounds: No murmur heard.  Pulmonary:      Effort: Pulmonary effort is normal.      Breath sounds: No wheezing, rhonchi or rales.   Abdominal:      General: Bowel sounds are normal.      Palpations: Abdomen is soft.      Tenderness: There is no abdominal tenderness.   Musculoskeletal:         General: No swelling. Normal range of motion.      Cervical back: No rigidity or tenderness.   Feet:      Right foot:      Protective Sensation: 7 sites tested.  7 sites sensed.      Skin integrity: Skin integrity normal. No ulcer or blister.      Toenail Condition: Right toenails are normal.      Left foot:      Protective Sensation: 7 sites tested.  7 sites sensed.      Skin integrity: Skin integrity normal. No ulcer or blister.      Toenail Condition: Left toenails are normal.      Comments: Diabetic Foot Exam Performed and Monofilament Test Performed     Lymphadenopathy:      Cervical: No cervical adenopathy.   Skin:     General: Skin is warm and dry.   Neurological:      Mental Status: She is alert.      Gait: Gait normal.   Psychiatric:         Mood and Affect: Mood normal.         Behavior: Behavior normal.         Judgment: Judgment normal.                  Assessment and Plan   Diagnoses and all orders for this visit:    1. Type 2 diabetes mellitus with diabetic neuropathy, without long-term current use of insulin  (Primary)  Comments:  Stable and well-controlled.  No changes in current Tx plan.  Recommend yearly eye exams    2. Benign essential hypertension  Comments:  BP is stable and well-controlled.  Will change BP checks to twice a week.  No changes needed current Tx plan for meds.  Avoid NA in diet  Orders:  -     metoprolol tartrate (LOPRESSOR) 25 MG tablet; Take 2 tablets by mouth 2 (Two) Times a Day.  -     losartan (COZAAR) 100 MG tablet; Take 0.5 tablets by mouth Daily.    3. Paroxysmal atrial fibrillation  Comments:  Follow-up with cardiology as directed.  She is overall stable on metoprolol and Eliquis.  She has a pacemaker    4. Cardiac pacemaker in situ    5. Diabetic ulcer of left foot associated with diabetes mellitus due to underlying condition, limited to breakdown of skin, unspecified part of foot  Comments:  Is continuing to heal without signs of infection.  Diabetes well-controlled with Victoza follow-up podiatry as directed rec yearly eye exams    6. 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  Stable on Uloric and tolerates med well    7. Other iron deficiency anemia  Comments:  Continue iron supplementation    8. Peripheral vascular disease  Comments:  Stable without any acute issues.  Diabetic foot ulcer is stable and slowly healing    9. Pure hypercholesterolemia  Comments:  Stable well-controlled on Lipitor 10 mg daily.  Will continue to check labs every 6 months and adjust medication dosing pending results    10. Polyneuropathy associated with underlying disease  Comments:  Stable and no acute issues.  She defers medications.  She tolerates pain well.  Recommend diabetic shoes    11. Stage 4 chronic kidney disease  Comments:  Labs reviewed.  She is overall stable.  Avoid NSAIDs and follow-up with nephrology as directed.  Keep BP under good control    12. Essential tremor  Comments:  Stable        Follow Up   Return in about  "3 months (around 6/13/2024) for Recheck.    Review of Systems   Constitutional:  Negative for chills, fatigue and fever.   HENT:  Negative for ear pain, sinus pressure and sore throat.    Eyes:  Negative for blurred vision and double vision.   Respiratory:  Negative for cough, shortness of breath and wheezing.    Cardiovascular:  Negative for chest pain and palpitations.   Gastrointestinal:  Negative for abdominal pain, blood in stool, constipation, diarrhea, nausea and vomiting.   Skin:  Negative for rash.   Neurological:  Negative for dizziness and headache.   Psychiatric/Behavioral:  Negative for sleep disturbance, suicidal ideas and depressed mood. The patient is not nervous/anxious.         Result Review   The following data was reviewed by Rosalind Blake MD on 03/13/2024.  Lab Results   Component Value Date    WBC 8.16 05/01/2017    HGB 9.4 (L) 05/01/2017    HCT 31.0 (L) 05/01/2017    MCV 88.1 05/01/2017     05/01/2017     Lab Results   Component Value Date    GLUCOSE 126 (H) 04/27/2017    BUN 88 (H) 04/27/2017    CREATININE 1.87 (H) 04/27/2017    BCR 47.1 (H) 04/27/2017    K 4.6 04/27/2017    CO2 26.8 04/27/2017    CALCIUM 9.4 04/27/2017    PROTENTOTREF 6.9 04/27/2017    ALBUMIN 4.30 04/27/2017    BILITOT 0.5 04/27/2017    AST 14 04/27/2017    ALT 13 04/27/2017     Lab Results   Component Value Date    CHLPL 123 04/27/2017    TRIG 152 (H) 04/27/2017    HDL 36 (L) 04/27/2017    LDL 57 04/27/2017     No results found for: \"TSH\"  No results found for: \"HGBA1C\"  No results found for: \"PSA\"    Part of this note may be an electronic transcription/translation of spoken language to printed   text using the Dragon Dictation System.      "

## 2024-03-19 DIAGNOSIS — E11.40 TYPE 2 DIABETES MELLITUS WITH DIABETIC NEUROPATHY, WITHOUT LONG-TERM CURRENT USE OF INSULIN: Primary | ICD-10-CM

## 2024-03-25 RX ORDER — APIXABAN 2.5 MG/1
2.5 TABLET, FILM COATED ORAL 2 TIMES DAILY
Qty: 60 TABLET | Refills: 5 | OUTPATIENT
Start: 2024-03-25

## 2024-04-09 ENCOUNTER — DOCUMENTATION (OUTPATIENT)
Dept: FAMILY MEDICINE CLINIC | Age: 84
End: 2024-04-09
Payer: MEDICARE

## 2024-04-09 RX ORDER — DULAGLUTIDE 0.75 MG/.5ML
0.75 INJECTION, SOLUTION SUBCUTANEOUS WEEKLY
Qty: 2 ML | Refills: 2 | Status: SHIPPED | OUTPATIENT
Start: 2024-04-09 | End: 2024-04-09 | Stop reason: SDUPTHER

## 2024-04-09 RX ORDER — DULAGLUTIDE 0.75 MG/.5ML
0.75 INJECTION, SOLUTION SUBCUTANEOUS WEEKLY
Qty: 2 ML | Refills: 2 | Status: SHIPPED | OUTPATIENT
Start: 2024-04-09 | End: 2024-04-14 | Stop reason: HOSPADM

## 2024-04-11 RX ORDER — LIRAGLUTIDE 6 MG/ML
INJECTION SUBCUTANEOUS
Qty: 6 ML | Refills: 3 | OUTPATIENT
Start: 2024-04-11

## 2024-04-12 DIAGNOSIS — M54.50 ACUTE MIDLINE LOW BACK PAIN WITHOUT SCIATICA: Primary | ICD-10-CM

## 2024-04-12 RX ORDER — TRAMADOL HYDROCHLORIDE 50 MG/1
50 TABLET ORAL EVERY 8 HOURS PRN
Qty: 40 TABLET | Refills: 0 | Status: SHIPPED | OUTPATIENT
Start: 2024-04-12 | End: 2024-04-14 | Stop reason: HOSPADM

## 2024-04-13 ENCOUNTER — HOSPITAL ENCOUNTER (INPATIENT)
Facility: HOSPITAL | Age: 84
LOS: 1 days | Discharge: ANOTHER HOSPITAL | DRG: 280 | End: 2024-04-14
Attending: INTERNAL MEDICINE | Admitting: INTERNAL MEDICINE
Payer: MEDICARE

## 2024-04-13 ENCOUNTER — READMISSION MANAGEMENT (OUTPATIENT)
Dept: CALL CENTER | Facility: HOSPITAL | Age: 84
End: 2024-04-13
Payer: MEDICARE

## 2024-04-13 PROBLEM — I46.9 CARDIAC ARREST: Status: ACTIVE | Noted: 2024-04-13

## 2024-04-13 LAB
ALBUMIN SERPL-MCNC: 3.4 G/DL (ref 3.5–5.2)
ALBUMIN/GLOB SERPL: 1.1 G/DL
ALP SERPL-CCNC: 135 U/L (ref 39–117)
ALT SERPL W P-5'-P-CCNC: 164 U/L (ref 1–33)
ANION GAP SERPL CALCULATED.3IONS-SCNC: 16 MMOL/L (ref 5–15)
AST SERPL-CCNC: 115 U/L (ref 1–32)
BILIRUB SERPL-MCNC: 0.4 MG/DL (ref 0–1.2)
BUN SERPL-MCNC: 66 MG/DL (ref 8–23)
BUN/CREAT SERPL: 23.7 (ref 7–25)
CA-I BLD-MCNC: 4.8 MG/DL (ref 4.6–5.4)
CA-I SERPL ISE-MCNC: 1.19 MMOL/L (ref 1.15–1.35)
CALCIUM SPEC-SCNC: 8.8 MG/DL (ref 8.6–10.5)
CHLORIDE SERPL-SCNC: 101 MMOL/L (ref 98–107)
CO2 SERPL-SCNC: 20 MMOL/L (ref 22–29)
CREAT SERPL-MCNC: 2.78 MG/DL (ref 0.57–1)
D-LACTATE SERPL-SCNC: 1.7 MMOL/L (ref 0.5–2)
DEPRECATED RDW RBC AUTO: 53.1 FL (ref 37–54)
EGFRCR SERPLBLD CKD-EPI 2021: 16.5 ML/MIN/1.73
ERYTHROCYTE [DISTWIDTH] IN BLOOD BY AUTOMATED COUNT: 16.2 % (ref 12.3–15.4)
GEN 5 2HR TROPONIN T REFLEX: 128 NG/L
GLOBULIN UR ELPH-MCNC: 3.1 GM/DL
GLUCOSE BLDC GLUCOMTR-MCNC: 247 MG/DL (ref 70–130)
GLUCOSE SERPL-MCNC: 222 MG/DL (ref 65–99)
HBA1C MFR BLD: 7.5 % (ref 4.8–5.6)
HCT VFR BLD AUTO: 29.8 % (ref 34–46.6)
HGB BLD-MCNC: 9.2 G/DL (ref 12–15.9)
INR PPP: 1.82 (ref 0.9–1.1)
MAGNESIUM SERPL-MCNC: 2.8 MG/DL (ref 1.6–2.4)
MCH RBC QN AUTO: 27.3 PG (ref 26.6–33)
MCHC RBC AUTO-ENTMCNC: 30.9 G/DL (ref 31.5–35.7)
MCV RBC AUTO: 88.4 FL (ref 79–97)
PHOSPHATE SERPL-MCNC: 5.8 MG/DL (ref 2.5–4.5)
PLATELET # BLD AUTO: 193 10*3/MM3 (ref 140–450)
PMV BLD AUTO: 10.5 FL (ref 6–12)
POTASSIUM SERPL-SCNC: 5 MMOL/L (ref 3.5–5.2)
PROCALCITONIN SERPL-MCNC: 3.61 NG/ML (ref 0–0.25)
PROT SERPL-MCNC: 6.5 G/DL (ref 6–8.5)
PROTHROMBIN TIME: 21.4 SECONDS (ref 11.7–14.2)
RBC # BLD AUTO: 3.37 10*6/MM3 (ref 3.77–5.28)
SODIUM SERPL-SCNC: 137 MMOL/L (ref 136–145)
TROPONIN T DELTA: -6 NG/L
TROPONIN T SERPL HS-MCNC: 134 NG/L
TSH SERPL DL<=0.05 MIU/L-ACNC: 4.14 UIU/ML (ref 0.27–4.2)
WBC NRBC COR # BLD AUTO: 14.01 10*3/MM3 (ref 3.4–10.8)

## 2024-04-13 PROCEDURE — 82948 REAGENT STRIP/BLOOD GLUCOSE: CPT

## 2024-04-13 PROCEDURE — 80053 COMPREHEN METABOLIC PANEL: CPT | Performed by: INTERNAL MEDICINE

## 2024-04-13 PROCEDURE — 25010000002 CEFTRIAXONE PER 250 MG: Performed by: INTERNAL MEDICINE

## 2024-04-13 PROCEDURE — 84145 PROCALCITONIN (PCT): CPT | Performed by: INTERNAL MEDICINE

## 2024-04-13 PROCEDURE — 84443 ASSAY THYROID STIM HORMONE: CPT | Performed by: INTERNAL MEDICINE

## 2024-04-13 PROCEDURE — 25010000002 AMIODARONE IN DEXTROSE 5% 360-4.14 MG/200ML-% SOLUTION

## 2024-04-13 PROCEDURE — 83036 HEMOGLOBIN GLYCOSYLATED A1C: CPT | Performed by: INTERNAL MEDICINE

## 2024-04-13 PROCEDURE — 85027 COMPLETE CBC AUTOMATED: CPT | Performed by: INTERNAL MEDICINE

## 2024-04-13 PROCEDURE — 63710000001 INSULIN LISPRO (HUMAN) PER 5 UNITS: Performed by: INTERNAL MEDICINE

## 2024-04-13 PROCEDURE — 93005 ELECTROCARDIOGRAM TRACING: CPT | Performed by: INTERNAL MEDICINE

## 2024-04-13 PROCEDURE — 84100 ASSAY OF PHOSPHORUS: CPT | Performed by: INTERNAL MEDICINE

## 2024-04-13 PROCEDURE — 82330 ASSAY OF CALCIUM: CPT | Performed by: INTERNAL MEDICINE

## 2024-04-13 PROCEDURE — 83605 ASSAY OF LACTIC ACID: CPT | Performed by: INTERNAL MEDICINE

## 2024-04-13 PROCEDURE — 83735 ASSAY OF MAGNESIUM: CPT | Performed by: INTERNAL MEDICINE

## 2024-04-13 PROCEDURE — 85610 PROTHROMBIN TIME: CPT | Performed by: INTERNAL MEDICINE

## 2024-04-13 PROCEDURE — 84484 ASSAY OF TROPONIN QUANT: CPT | Performed by: INTERNAL MEDICINE

## 2024-04-13 RX ORDER — SODIUM CHLORIDE 9 MG/ML
40 INJECTION, SOLUTION INTRAVENOUS AS NEEDED
Status: DISCONTINUED | OUTPATIENT
Start: 2024-04-13 | End: 2024-04-14 | Stop reason: HOSPADM

## 2024-04-13 RX ORDER — BISACODYL 5 MG/1
5 TABLET, DELAYED RELEASE ORAL DAILY PRN
Status: DISCONTINUED | OUTPATIENT
Start: 2024-04-13 | End: 2024-04-14 | Stop reason: HOSPADM

## 2024-04-13 RX ORDER — FUROSEMIDE 20 MG/1
20 TABLET ORAL DAILY
Status: DISCONTINUED | OUTPATIENT
Start: 2024-04-13 | End: 2024-04-14

## 2024-04-13 RX ORDER — NICOTINE POLACRILEX 4 MG
15 LOZENGE BUCCAL
Status: DISCONTINUED | OUTPATIENT
Start: 2024-04-13 | End: 2024-04-14 | Stop reason: HOSPADM

## 2024-04-13 RX ORDER — ASCORBIC ACID 500 MG
500 TABLET ORAL EVERY OTHER DAY
COMMUNITY
End: 2024-04-14 | Stop reason: HOSPADM

## 2024-04-13 RX ORDER — SODIUM CHLORIDE 0.9 % (FLUSH) 0.9 %
10 SYRINGE (ML) INJECTION EVERY 12 HOURS SCHEDULED
Status: DISCONTINUED | OUTPATIENT
Start: 2024-04-13 | End: 2024-04-14 | Stop reason: HOSPADM

## 2024-04-13 RX ORDER — ECHINACEA PURPUREA EXTRACT 125 MG
2 TABLET ORAL AS NEEDED
COMMUNITY
End: 2024-04-14 | Stop reason: HOSPADM

## 2024-04-13 RX ORDER — NITROGLYCERIN 0.4 MG/1
0.4 TABLET SUBLINGUAL
Status: DISCONTINUED | OUTPATIENT
Start: 2024-04-13 | End: 2024-04-14 | Stop reason: HOSPADM

## 2024-04-13 RX ORDER — MECLIZINE HYDROCHLORIDE 25 MG/1
12.5 TABLET ORAL 3 TIMES DAILY PRN
COMMUNITY
End: 2024-04-14 | Stop reason: HOSPADM

## 2024-04-13 RX ORDER — POLYETHYLENE GLYCOL 3350 17 G/17G
17 POWDER, FOR SOLUTION ORAL DAILY PRN
Status: DISCONTINUED | OUTPATIENT
Start: 2024-04-13 | End: 2024-04-14 | Stop reason: HOSPADM

## 2024-04-13 RX ORDER — LORATADINE 10 MG/1
10 CAPSULE, LIQUID FILLED ORAL DAILY
COMMUNITY
End: 2024-04-14 | Stop reason: HOSPADM

## 2024-04-13 RX ORDER — ACETAMINOPHEN 325 MG/1
650 TABLET ORAL EVERY 4 HOURS PRN
Status: DISCONTINUED | OUTPATIENT
Start: 2024-04-13 | End: 2024-04-14 | Stop reason: HOSPADM

## 2024-04-13 RX ORDER — ACETAMINOPHEN 650 MG/1
650 SUPPOSITORY RECTAL EVERY 8 HOURS PRN
Status: DISCONTINUED | OUTPATIENT
Start: 2024-04-13 | End: 2024-04-14 | Stop reason: HOSPADM

## 2024-04-13 RX ORDER — DEXTROSE MONOHYDRATE 25 G/50ML
25 INJECTION, SOLUTION INTRAVENOUS
Status: DISCONTINUED | OUTPATIENT
Start: 2024-04-13 | End: 2024-04-14 | Stop reason: HOSPADM

## 2024-04-13 RX ORDER — INSULIN LISPRO 100 [IU]/ML
2-9 INJECTION, SOLUTION INTRAVENOUS; SUBCUTANEOUS
Status: DISCONTINUED | OUTPATIENT
Start: 2024-04-13 | End: 2024-04-14 | Stop reason: HOSPADM

## 2024-04-13 RX ORDER — LOSARTAN POTASSIUM 50 MG/1
50 TABLET ORAL
Status: DISCONTINUED | OUTPATIENT
Start: 2024-04-13 | End: 2024-04-14 | Stop reason: HOSPADM

## 2024-04-13 RX ORDER — ASPIRIN 81 MG/1
81 TABLET, CHEWABLE ORAL DAILY
Status: DISCONTINUED | OUTPATIENT
Start: 2024-04-13 | End: 2024-04-14 | Stop reason: HOSPADM

## 2024-04-13 RX ORDER — SPIRONOLACTONE 25 MG/1
25 TABLET ORAL DAILY
COMMUNITY
End: 2024-04-14 | Stop reason: HOSPADM

## 2024-04-13 RX ORDER — CETIRIZINE HYDROCHLORIDE 10 MG/1
10 TABLET ORAL DAILY
COMMUNITY
End: 2024-04-14 | Stop reason: HOSPADM

## 2024-04-13 RX ORDER — PETROLATUM,WHITE
1 OINTMENT IN PACKET (GRAM) TOPICAL AS NEEDED
COMMUNITY
End: 2024-04-14 | Stop reason: HOSPADM

## 2024-04-13 RX ORDER — BISACODYL 10 MG
10 SUPPOSITORY, RECTAL RECTAL DAILY PRN
Status: DISCONTINUED | OUTPATIENT
Start: 2024-04-13 | End: 2024-04-14 | Stop reason: HOSPADM

## 2024-04-13 RX ORDER — IBUPROFEN 600 MG/1
1 TABLET ORAL
Status: DISCONTINUED | OUTPATIENT
Start: 2024-04-13 | End: 2024-04-14 | Stop reason: HOSPADM

## 2024-04-13 RX ORDER — ACETAMINOPHEN 325 MG/1
650 TABLET ORAL EVERY 6 HOURS PRN
COMMUNITY
End: 2024-04-14 | Stop reason: HOSPADM

## 2024-04-13 RX ORDER — AMOXICILLIN 250 MG
2 CAPSULE ORAL 2 TIMES DAILY
Status: DISCONTINUED | OUTPATIENT
Start: 2024-04-13 | End: 2024-04-14 | Stop reason: HOSPADM

## 2024-04-13 RX ORDER — IBUPROFEN 200 MG
200 TABLET ORAL 2 TIMES DAILY PRN
COMMUNITY
End: 2024-04-14 | Stop reason: HOSPADM

## 2024-04-13 RX ORDER — METOPROLOL SUCCINATE 50 MG/1
50 TABLET, EXTENDED RELEASE ORAL
Status: DISCONTINUED | OUTPATIENT
Start: 2024-04-13 | End: 2024-04-14 | Stop reason: HOSPADM

## 2024-04-13 RX ORDER — ATORVASTATIN CALCIUM 20 MG/1
10 TABLET, FILM COATED ORAL NIGHTLY
Status: DISCONTINUED | OUTPATIENT
Start: 2024-04-13 | End: 2024-04-14 | Stop reason: HOSPADM

## 2024-04-13 RX ORDER — NYSTATIN 100000 U/G
1 CREAM TOPICAL 2 TIMES DAILY
COMMUNITY
End: 2024-04-14 | Stop reason: HOSPADM

## 2024-04-13 RX ORDER — UBIDECARENONE 100 MG
100 CAPSULE ORAL DAILY
COMMUNITY
End: 2024-04-14 | Stop reason: HOSPADM

## 2024-04-13 RX ORDER — FERROUS SULFATE 325(65) MG
325 TABLET ORAL
Status: DISCONTINUED | OUTPATIENT
Start: 2024-04-14 | End: 2024-04-14 | Stop reason: HOSPADM

## 2024-04-13 RX ORDER — FEBUXOSTAT 40 MG/1
40 TABLET, FILM COATED ORAL
Status: DISCONTINUED | OUTPATIENT
Start: 2024-04-13 | End: 2024-04-14 | Stop reason: HOSPADM

## 2024-04-13 RX ORDER — METOPROLOL SUCCINATE 50 MG/1
50 TABLET, EXTENDED RELEASE ORAL 2 TIMES DAILY
COMMUNITY

## 2024-04-13 RX ORDER — SODIUM CHLORIDE 0.9 % (FLUSH) 0.9 %
10 SYRINGE (ML) INJECTION AS NEEDED
Status: DISCONTINUED | OUTPATIENT
Start: 2024-04-13 | End: 2024-04-14 | Stop reason: HOSPADM

## 2024-04-13 RX ADMIN — FEBUXOSTAT 40 MG: 40 TABLET, FILM COATED ORAL at 20:56

## 2024-04-13 RX ADMIN — FUROSEMIDE 20 MG: 20 TABLET ORAL at 20:22

## 2024-04-13 RX ADMIN — INSULIN LISPRO 4 UNITS: 100 INJECTION, SOLUTION INTRAVENOUS; SUBCUTANEOUS at 20:49

## 2024-04-13 RX ADMIN — APIXABAN 2.5 MG: 2.5 TABLET, FILM COATED ORAL at 20:57

## 2024-04-13 RX ADMIN — ASPIRIN 81 MG: 81 TABLET, CHEWABLE ORAL at 20:22

## 2024-04-13 RX ADMIN — ATORVASTATIN CALCIUM 10 MG: 20 TABLET, FILM COATED ORAL at 20:53

## 2024-04-13 RX ADMIN — Medication 10 ML: at 20:58

## 2024-04-13 RX ADMIN — CEFTRIAXONE 2000 MG: 2 INJECTION, POWDER, FOR SOLUTION INTRAMUSCULAR; INTRAVENOUS at 20:22

## 2024-04-13 RX ADMIN — AMIODARONE HYDROCHLORIDE 0.5 MG/MIN: 1.8 INJECTION, SOLUTION INTRAVENOUS at 23:00

## 2024-04-13 RX ADMIN — ACETAMINOPHEN 650 MG: 325 TABLET ORAL at 20:33

## 2024-04-13 RX ADMIN — METOPROLOL SUCCINATE 50 MG: 50 TABLET, EXTENDED RELEASE ORAL at 20:56

## 2024-04-13 NOTE — H&P
LOS: 0 days   Patient Care Team:  Rosalind Blake MD as PCP - General (Family Medicine)  Logan Lopez APRN as Nurse Practitioner (Nurse Practitioner)  Jose Brice APRN (Family Medicine)  Krzysztof Nicholas APRN (Nurse Practitioner)  Dayo Beck MD PhD as Consulting Physician (Ophthalmology)  Kanu Dewitt DPM as Consulting Physician (Podiatry)  Bryant Burnette MD (Hematology and Oncology)  Michael Cochran MD as Consulting Physician (Pulmonary Disease)  Kanu Bennett MD as Consulting Physician (Nephrology)    Subjective     Patient transferred here apparently at the request of cardiology but they requested that we admit the patient.  Sent from [facility] for episode of V. tach.  Dr. Jovel cardiology is seeing the patient..  Their principal diagnosis was SIRS versus sepsis without identified source of infection ventricular tachycardia, ventricular fibrillation, metabolic acidosis, pulmonary nodule, diabetes mellitus, non-ST elevation MI, iron deficiency anemia and diabetic foot wound.  Patient apparently has a history of paroxysmal A-fib and a permanent pacemaker she additionally has chronic kidney disease stage III and obstructive sleep apnea coronary artery disease with prior coronary bypass grafting who presented to the outside emergency department on 4/12 with complaints of lower back and buttocks pain and difficulty ambulating due to the pain she had a leukocytosis of 15,000 and a fever to 100.5 and an elevated CRP and procalcitonin apparently she underwent a CT abdomen and pelvis and chest without identified source of infection but they did find a pulmonary nodule and pleural effusions along with cardiomegaly UA is reported as negative COVID flu and RSV screens were negative blood cultures drawn and are pending a started on broad-spectrum antibiotics admitted her to their unit she apparently shortly after admission felt like she was going to pass out  and subsequently did lose consciousness and pulse with monitor showing ventricular tachycardia and degrading to ventricular fibrillation and she was coded .did send us a strip of ventricular tachycardia.  CT did report an 11 mm right lower lobe nodule.  Following her code she was transferred to their ICU and placed on amiodarone drip she was apparently started on Levophed but they were able to wean that down her ABG immediately after the code showed an acidosis metabolic.   Reviewing Dr. Jovel's note patient based on a November 23 echocardiogram had a cardiomyopathy with an LVEF of 40 to 45% and severe pulmonary hypertension with RV systolic pressure 68 moderate to severe mitral regurgitation mild aortic stenosis LVH and enlargement of the left atrium a dual-chamber Medtronic pacemaker    Patient reports to me that she has had this pain for over a year she has been through physical therapy that helped a little but it has been getting progressively worse and she was not able to transfer she had to have a couple of nurses get underarms just to get her from bed to commode.  The pain is very low in her back actually the area she points to is about mid buttocks she says it is on both sides and you cannot push and cause any pain.  He said no one has really told her what the cause of the pain is.  She has not had any chest pain no fluttering or lightheadedness she had 1 episode of cardiac arrest back about the time of her bypass she has not had any since.  No nausea vomiting or diarrhea.  No cough no dysuria or hematuria.  She is having a little soreness in her chest along her ribs anteriorly post CPR  Review of Systems:   Longstanding diabetes she has had left fifth toe amputated for diabetic wound.  No fevers chills or sweats no bleeding problems no polyuria or polydipsia and no heat or cold intolerance .  Patient has had some problems she was on good management with her diabetes apparently then they could no longer get  medications she has had to change medications and not done well.      Objective     Vital Signs  Vital Sign Min/Max for last 24 hours  Temp  Min: 98.3 °F (36.8 °C)  Max: 98.3 °F (36.8 °C)   No data recorded   No data recorded   No data recorded   No data recorded   No data recorded   Weight  Min: 91 kg (200 lb 9.9 oz)  Max: 91 kg (200 lb 9.9 oz)        Ventilator/Non-Invasive Ventilation Settings (From admission, onward)      None                         Body mass index is 33.38 kg/m² (pended).  No intake/output data recorded.  No intake/output data recorded.        Physical Exam:  General Appearance: Morbidly obese white female she is resting in bed as long as she is at rest she is not any distress but she has severe pain when she moves.  Eyes: Conjunctiva are clear and anicteric pupils are equal and reactive to light  ENT: Mucous membranes are little dry no erythema no exudates she has a Mallampati type II airway, nasal septum midline  Neck: No lymphadenopathy or thyromegaly no jugular venous tension and trachea midline  Lungs: Clear nonlabored symmetric expansion no wheezes no rales no rhonchi  Cardiac: Regular rate rhythm murmur loudest in the left upper sternal border  Abdomen: Soft nontender no palpable hepatosplenomegaly or masses  : Not examined  Musculoskeletal: She cannot sit up well for pain I rolled her on her side she was still having severe pain she points to the pain and its right at and just below her femur head I pushed all around and there she says it was painful but it was not me making it any worse I pushed up and down her spine and really did not cause any pain.  There is no skin rashes or lesions in the area.  She has a left fifth toe surgically absent  Skin: No jaundice no petechiae skin is warm and dry  Neuro: She is alert and oriented she is cooperative following commands grossly intact  Extremities/P Vascular: No clubbing no cyanosis and no edema she has palpable radial pulses her  "dorsalis pedis pulses are present they are not as strong as the radial pulses.  MSE: She is pleasant and appropriate       Labs:  Results from last 7 days   Lab Units 04/13/24 0223 04/12/24 1949   MAGNESIUM mg/dL 2.6* 2.1     CrCl cannot be calculated (Patient's most recent lab result is older than the maximum 30 days allowed.).                      Results from last 7 days   Lab Units 04/12/24 1949   TSH uIU/mL 1.881     Results from last 7 days   Lab Units 04/13/24  0155   PROCALCITONIN ng/mL 1.62*     Results from last 7 days   Lab Units 04/13/24  0223 04/12/24 1949   INR  1.94* 1.68*     Microbiology Results (last 10 days)       Procedure Component Value - Date/Time    RSV PCR - , [[id number]]  (Normal) Collected: 04/12/24 2258    Lab Status: Final result Updated: 04/13/24 1511                  No current facility-administered medications for this encounter.      Diagnostics:  XR chest AP portable    Result Date: 4/13/2024  PORTABLE CHEST HISTORY: Status post CODE BLUE, SIRS. COMPARISON: 4/12/2024. FINDINGS: A single portable radiograph of the chest was performed. The patient is status post sternotomy. There is a left pacemaker. There is cardiomegaly with calcified plaque of the aorta. There is pulmonary vascular congestion. Mild interstitial edema is not excluded. There are no significant effusions.    Cardiomegaly with vascular congestion. Interstitial edema is not excluded. Images reviewed, interpreted, dictated and electronically signed by Charan Marley MD Voice transcription technology ([employer]) is used for the dictation of this note and \"sound-alike\" words might be erroneously placed despite reviewing this note for accuracy. Errors in dictation may reflect use of voice recognition software and not all errors in transcription may have been detected prior to signing.    CT Abdomen Pelvis With Contrast    Result Date: 4/12/2024  CT SCAN OF THE CHEST, ABDOMEN AND PELVIS WITH CONTRAST    4/12/2024 9:40 " PM HISTORY: Sepsis. COMPARISON: Chest x-ray from the same day. PROCEDURE: The patient was injected with IV contrast. Oral contrast was administered. Axial images were obtained from the lung apex to the pubic symphysis by computed tomography. This study was performed with techniques to keep radiation doses as low as reasonably achievable, (ALARA). Individualized dose reduction techniques using automated exposure control or adjustment of mA and/or kV according to the patient size were employed. FINDINGS: CHEST: There are minimally increased interstitial markings seen diffusely. There are trace bilateral pleural effusions. There is an 11 mm pulmonary nodule in the right lung base, which is nonspecific. There is no axillary adenopathy. There is no hilar or mediastinal adenopathy. No pulmonary arterial filling defects are identified within the main and right and left pulmonary arteries. Contrast opacification in the interlobar and segmental branches is diminished, and small filling defects cannot be excluded. The heart size is enlarged. There is no pericardial effusion. No pneumothorax. No suspicious infiltrate or nodule identified. Left-sided cardiac pacer device is noted. ABDOMEN: The liver is diffusely hypoattenuating consistent with fatty infiltration. Gallbladder is surgically absent. The spleen and the pancreas are grossly unremarkable. Adrenal glands are unremarkable. Bilateral kidneys demonstrate benign-appearing cystic lesions. Mild renal cortical thinning. No hydronephrosis. No nephroureteral calculi are evident. Mild colonic diverticula without evidence of acute diverticulitis. The aorta is normal in caliber. There is no free fluid or adenopathy. No dilated loops of bowel. No free intraperitoneal air. No significant stranding seen in the mesenteries. PELVIS: The GI tract demonstrates no obstruction. The appendix is surgically absent. The urinary bladder is unremarkable.  There is no free fluid, adenopathy, or  inflammatory process. Patient appears to be status post hysterectomy. BONES: No acute fractures. Advanced degenerative changes are present throughout the visualized spine.    Cardiomegaly, with minimal interstitial changes. Trace bilateral pleural effusions. Findings may reflect a mild CHF exacerbation. An 11 mm pulmonary nodule in the right lung base. Follow-up chest CT with IV contrast in 3 months is recommended to document stability of this finding. Alternatively consider further characterization with tissue sampling, or PET/CT. No acute abnormality within the abdomen/pelvis.    CT Chest With Contrast Diagnostic    Result Date: 4/12/2024  CT SCAN OF THE CHEST, ABDOMEN AND PELVIS WITH CONTRAST    4/12/2024 9:40 PM HISTORY: Sepsis. COMPARISON: Chest x-ray from the same day. PROCEDURE: The patient was injected with IV contrast. Oral contrast was administered. Axial images were obtained from the lung apex to the pubic symphysis by computed tomography. This study was performed with techniques to keep radiation doses as low as reasonably achievable, (ALARA). Individualized dose reduction techniques using automated exposure control or adjustment of mA and/or kV according to the patient size were employed. FINDINGS: CHEST: There are minimally increased interstitial markings seen diffusely. There are trace bilateral pleural effusions. There is an 11 mm pulmonary nodule in the right lung base, which is nonspecific. There is no axillary adenopathy. There is no hilar or mediastinal adenopathy. No pulmonary arterial filling defects are identified within the main and right and left pulmonary arteries. Contrast opacification in the interlobar and segmental branches is diminished, and small filling defects cannot be excluded. The heart size is enlarged. There is no pericardial effusion. No pneumothorax. No suspicious infiltrate or nodule identified. Left-sided cardiac pacer device is noted. ABDOMEN: The liver is diffusely  hypoattenuating consistent with fatty infiltration. Gallbladder is surgically absent. The spleen and the pancreas are grossly unremarkable. Adrenal glands are unremarkable. Bilateral kidneys demonstrate benign-appearing cystic lesions. Mild renal cortical thinning. No hydronephrosis. No nephroureteral calculi are evident. Mild colonic diverticula without evidence of acute diverticulitis. The aorta is normal in caliber. There is no free fluid or adenopathy. No dilated loops of bowel. No free intraperitoneal air. No significant stranding seen in the mesenteries. PELVIS: The GI tract demonstrates no obstruction. The appendix is surgically absent. The urinary bladder is unremarkable.  There is no free fluid, adenopathy, or inflammatory process. Patient appears to be status post hysterectomy. BONES: No acute fractures. Advanced degenerative changes are present throughout the visualized spine.    Cardiomegaly, with minimal interstitial changes. Trace bilateral pleural effusions. Findings may reflect a mild CHF exacerbation. An 11 mm pulmonary nodule in the right lung base. Follow-up chest CT with IV contrast in 3 months is recommended to document stability of this finding. Alternatively consider further characterization with tissue sampling, or PET/CT. No acute abnormality within the abdomen/pelvis.    XR Chest 1 View    Result Date: 4/12/2024  PORTABLE CHEST      4/12/2024 7:42 PM HISTORY: Back pain. COMPARISON: August 2023. FINDINGS: Postsurgical changes of median sternotomy and CABG are again noted. Again noted is a left-sided pacer device. The heart is enlarged, and increased in size since prior study. The hilar structures are prominent. There is a trace left pleural effusions and mild diffuse interstitial and airspace opacities in the mid and lower lung zones. There is no pneumothorax. The osseous structures are unremarkable.    Worsening cardiomegaly. Mild diffuse infiltrates may be secondary to CHF.    Duplex  Carotid Ultrasound CAR    Result Date: 3/22/2024  This result has an attachment that is not available. Table formatting from the original result was not included. Carotid Duplex - CPT 38422 Technique: Extracranial cerebrovascular duplex evaluation was performed utilizing gray scale, color flow, and Doppler spectral analysis. Indication: Known Stenosis READING MD FINDINGS: Right   Left   PSV (cm/s) EDV (cm/s)  PSV (cm/s) EDV (cm/s) 100 15 CCA Proximal 72 15 99 15 CCA Mid 87 15 57 15 CCA Distal 76 18   Bulb/bifurcation   193 44 ICA Proximal 134 30 167 33 ICA MId 133 28 134 38 ICA Distal 80 23 128       Vertebral   Direction Antegrade  Direction Antegrade   Subclavian   Waveform Triphasic  Waveform Triphasic 1.95 ICA/CCA Ratio 1.54 Plaque Characterization: Right  Left None Common Carotid Artery Mild Mild External Carotid Artery Heterogeneous Heterogeneous Bulb Heterogeneous Heterogeneous Internal Carotid Artery Heterogeneous          50-69% stenosis at the right proximal internal carotid artery, moderate plaque. <50% stenosis at the left proximal internal carotid artery, mild plaque. Plaque at the proximal external carotid artery bilaterally, increased velocity on the left suggests stenosis. Normal antegrade flow in the vertebral artery bilaterally.      Compared to the previous study on 9/19/23, there is no significant change. The Society of Radiologists in Ultrasound (SRU) Consensus interpretive criteria was utilized in estimating percent of stenosis.     Doppler Ankle Brachial Index Single Level CAR    Result Date: 3/22/2024  This result has an attachment that is not available. Table formatting from the original result was not included. Ankle Brachial Index - CPT 12062 Technique: Continuous wave Doppler evaluation of the lower extremity arteries was performed using Doppler, pressures, and waveforms. Indication: Peripheral Vascular Disease, left foot slow healing wound READING MD FINDINGS: Right YULIA Right TBI  Left YULIA Left TBI 1.02 0.35 0.99 0.19 Biphasic Doppler flow at the right and left distal posterior tibial, monophasic at the dorsalis pedis arteries.                                                                                                                                                                                                                                                                                                                                                                                                                                                                                                                                                                                                                                                                               Digit pressures and waveforms suggest abnormal arterial flow to the small vessels bilaterally.                                     1. Normal arterial perfusion in both legs with an YULIA of 1.02 on the right and 0.99 on the left. 2. Abnormal arterial flow to the great toe bilaterally. Compared to the previous study on 9/19/23, there is no significant change.             There are no hospital problems to display for this patient.        Assessment & Plan     V. tach/V-fib arrest resuscitated she had to shocks in her CPR she is on amiodarone we will continue amiodarone drip and consult Dr. Jovel to manage her cardiac issues  Pain this is really extremely low back is actually below her back even below her sacrum it seems to be at about the level of her femoral heads but is bilateral, it is not acute it has been present for over a year it is just become more acutely worse.  She may need further imaging of the hip and pelvic area there seems to be some radiation of the pain a little bit down the buttocks and thigh area I am wondering if this is neuropathic.  Coronary artery disease with remote coronary bypass grafting  Cardiomyopathy  with reduced left ventricular ejection fraction  Valvular heart disease with moderate to severe mitral regurgitation and mild aortic stenosis  Pulmonary hypertension by prior echocardiogram severe  Diabetes mellitus  Elevated troponin mildly elevated when she came in she had a significant bump it looks like after her arrest and cardioversion/CPR which is not unexpected.  Question need for ischemic workup with her arrest.  Will defer to cardiology  Chronic kidney disease stage IIIb  Pulmonary nodule right lower lobe 11 mm this will need to be followed now is not the time to address  Question infection patient had a elevated procalcitonin on admission and a fever and leukocytosis she has been on antibiotics cultures are pending no definite source but I guess we need to continue antibiotics for now.  I wonder if she could have an infection in her hip or back that is contributing to the pain just an awful long time for over a year to go by.  I will ask infectious disease to see her and again I may want to get an MRI of her lower spine and hips looking for chronic infection but need her to be stable cardiac  Hypertension continue home medications  Hyperlipidemia continue home statin therapy wise    Plan for disposition:    Rikki Thayer Jr, MD  04/13/24  16:06 EDT    Time: Critical care time 65 minutes

## 2024-04-13 NOTE — PLAN OF CARE
Goal Outcome Evaluation:           Progress: no change     Pt was a direct transfer from [facility] this afternoon.  A&O, very pleasant.  Room air.  VSS.  Started diet.  UOP via lowery placed at previous hospital this morning.  No BM.  Pt and sister sitters at bedside updated by intensivitst and RNs.  Cardiology consulted; some home meds restarted.

## 2024-04-13 NOTE — CONSULTS
Yola Dorman   83 y.o.  female    LOS: 0 days   Patient Care Team:  Rosalind Blake MD as PCP - General (Family Medicine)  Logan Lopez APRN as Nurse Practitioner (Nurse Practitioner)  Jose Brice APRN (Family Medicine)  Krzysztof Nicholas APRN (Nurse Practitioner)  Dayo Beck MD PhD as Consulting Physician (Ophthalmology)  Kanu Dewitt DPM as Consulting Physician (Podiatry)  Bryant Burnette MD (Hematology and Oncology)  Michael Cochran MD as Consulting Physician (Pulmonary Disease)  Kanu Bennett MD as Consulting Physician (Nephrology)      Subjective     Patient Complaints: Cardiac Arrest    History of Present Illness:     Yola Dorman is a pleasant 84 yo white female who is seen by my colleagues, Dr Jovel and Jose Brice who also had a pacemaker placed by myself. She has a history of Known CAD with previous CABG, mild CM with last EF of 40-45%, Paroxysmal Atrial Fibrillation with SSS with PPM - Medtronic. She had been complaining of back pain - brought to the hospital - thought to have early SIRS/Sepsis - she had a leukocytosis of 15,000 and a fever to 100.5 and an elevated CRP and procalcitonin apparently she underwent a CT abdomen and pelvis and chest without identified source of infection but they did find a pulmonary nodule and pleural effusions along with cardiomegaly - possible UTI - w/u unremarkable - negative UTI, COVID, RSV - blood cultures pending - started ABX. Noted to have VT - chest compressions and shocked - now in paced rhythm. I was called for transferring the patient - patient was supposed to be transferred to the CCU at [facility] - then transferred here.     Following her code she was transferred to the ICU  at [facility] and placed on amiodarone drip she was apparently started on Levophed but they were able to wean that down her ABG immediately after the code showed an acidosis metabolic. Her last echocardiogram had a mild  cardiomyopathy with an LVEF of 40 to 45% and severe pulmonary hypertension with RV systolic pressure 68 moderate to severe mitral regurgitation mild aortic stenosis LVH and enlargement of the left atrium a dual-chamber Medtronic pacemaker.     Patient reports to me that she has had this pain for over a year she has been through physical therapy that helped a little but it has been getting progressively worse and she was not able to transfer she had to have a couple of nurses get underarms just to get her from bed to commode.  The pain is very low in her back actually the area she points to is about mid buttocks she says it is on both sides and you cannot push and cause any pain.  He said no one has really told her what the cause of the pain is.  She has not had any chest pain no fluttering or lightheadedness.  No nausea vomiting or diarrhea.  No cough no dysuria or hematuria.  She is having a little soreness in her chest along her ribs anteriorly post CPR. She is not able to eat.      Review of Systems:   Negative except as stated above.    Medication Review: Reviewed      Family History   Problem Relation Age of Onset    COPD Mother     Cancer Father     Alcohol abuse Sister     Cancer Sister         neuroendocrine cancer    Alcohol abuse Brother     Hypertension Brother     Cancer Paternal Aunt     Cancer Paternal Uncle     Alcohol abuse Brother     Hearing loss Brother     Hearing loss Brother     Kidney cancer Brother     Hypertension Sister     Liver cancer Sister     Cancer Sister         neuroendocrine cancer    Hypothyroidism Sister     Liver cancer Sister     Cancer Paternal Aunt     Cancer Paternal Aunt     Cancer Paternal Aunt     Cancer Paternal Uncle     Cancer Paternal Uncle     Cancer Paternal Uncle      Social History     Socioeconomic History    Marital status: Single   Tobacco Use    Smoking status: Never     Passive exposure: Never    Smokeless tobacco: Never   Vaping Use    Vaping status: Never  Used   Substance and Sexual Activity    Alcohol use: No    Drug use: No    Sexual activity: Never     Objective   Past Surgical History:   Procedure Laterality Date    APPENDECTOMY      CHOLECYSTECTOMY      CORONARY ANGIOPLASTY      CORONARY ARTERY BYPASS GRAFT      EYE SURGERY      FOOT WOUND CLOSURE      HYSTERECTOMY      INCISION AND DRAINAGE FOOT      INCISION AND DRAINAGE SHOULDER      PACEMAKER IMPLANTATION      SKIN CANCER EXCISION      basil cell     TOE AMPUTATION      TONSILLECTOMY       Past Medical History:   Diagnosis Date    Allergic     Arthritis     Cataract     CPAP (continuous positive airway pressure) dependence     Diabetes mellitus     Hyperlipidemia     Hypertension     Shingles     Skin cancer, basal cell     Sleep apnea        Vital Sign Min/Max for last 24 hours  Temp  Min: 98.1 °F (36.7 °C)  Max: 98.3 °F (36.8 °C)   BP  Min: 126/61  Max: 133/61    Pulse  Min: 60  Max: 66     Wt Readings from Last 3 Encounters:   04/13/24 91 kg (200 lb 9.9 oz)   03/13/24 92.1 kg (203 lb)   02/14/24 91.5 kg (201 lb 12.8 oz)        Physical Exam:      General Appearance:    Alert, cooperative, in no acute distress   Head:    Normocephalic, without obvious abnormality, atraumatic   Eyes:            Conjunctivae normal, no   icterus   Neck:   No adenopathy, supple, trachea midline, no thyromegaly, no   carotid bruit, no JVD   Lungs:     Clear to auscultation,respirations regular, even and                  unlabored    Heart:    Regular rhythm and normal rate, normal S1 and S2,            loud HOEM, no gallop, no rub, no click   Chest Wall:    No abnormalities observed   Abdomen:     Normal bowel sounds, no masses, no organomegaly, soft     nontender, nondistended, no guarding, no rebound                tenderness   Rectal:     Deferred   Extremities:   Trace LE edema. Moves all extremities well, no cyanosis, no erythema   Pulses:   Pulses palpable and equal bilaterally   Skin:   No bleeding, bruising or rash  "  Neurologic:   Cranial nerves 2 - 12 grossly intact, sensation intact, DTR       present and equal bilaterally        Results Review:     I reviewed the patient's new clinical results.  I personally viewed and interpreted the patient's EKG/Telemetry data  Potassium   Date Value Ref Range Status   04/13/2024 5.0 3.5 - 5.2 mmol/L Final     Creatinine   Date Value Ref Range Status   04/13/2024 2.78 (H) 0.57 - 1.00 mg/dL Final     HS Troponin T   Date Value Ref Range Status   04/13/2024 134 (C) <14 ng/L Final        Echo EF Estimated  No results found for: \"ECHOEFEST\"      Assessment & Plan       Cardiac arrest      A/P: 84 yo female with history of Cardiac Arrest.    Cardiac Arrest - in patient with history of known ICM - scar based reentry - nothing to suggest ACS. Will review to see when had last ischemia work-up. On Amiodarone - can continue 24 hour load - then change to 200 mg bid. Keep K > 4, Mg > 2. Will require consideration of upgrade of device for secondary prophylaxis.  Pacemaker - implanted for SSS - will have device interrogated.  NSTEMI - secondary to defibrillation - not suggestive of ACS - type 2.  Atrial Fibrillation - paroxysmal - elevated [id number] score of at least 5 - on Eliquis. In SR.  Chest wall Pain - due to cracked ribs from CPR - pain medications and encourage IS to prevent atelectasis.  HLD - on Statin treat to goal.  HTN - controlled.    Thank you for allowing me to participate in the care of this patient. Warmest Regards.        > 75 minutes of CCU time spent.      Tad Garg DO  04/13/24  19:58 EDT          Dictated portions using Dragon dictation software.     During the entire encounter, I was wearing recommended PPE including face mask and eye protection. Hand sanitization was performed prior to entering room and upon exit.              "

## 2024-04-14 ENCOUNTER — APPOINTMENT (OUTPATIENT)
Dept: CARDIOLOGY | Facility: HOSPITAL | Age: 84
DRG: 280 | End: 2024-04-14
Payer: MEDICARE

## 2024-04-14 VITALS
HEIGHT: 65 IN | HEART RATE: 64 BPM | DIASTOLIC BLOOD PRESSURE: 57 MMHG | WEIGHT: 200.62 LBS | SYSTOLIC BLOOD PRESSURE: 119 MMHG | OXYGEN SATURATION: 98 % | RESPIRATION RATE: 16 BRPM | BODY MASS INDEX: 33.43 KG/M2 | TEMPERATURE: 97.9 F

## 2024-04-14 LAB
ANION GAP SERPL CALCULATED.3IONS-SCNC: 15 MMOL/L (ref 5–15)
BUN SERPL-MCNC: 69 MG/DL (ref 8–23)
BUN/CREAT SERPL: 21.7 (ref 7–25)
CALCIUM SPEC-SCNC: 8.5 MG/DL (ref 8.6–10.5)
CHLORIDE SERPL-SCNC: 105 MMOL/L (ref 98–107)
CO2 SERPL-SCNC: 17 MMOL/L (ref 22–29)
CREAT SERPL-MCNC: 3.18 MG/DL (ref 0.57–1)
DEPRECATED RDW RBC AUTO: 49.8 FL (ref 37–54)
EGFRCR SERPLBLD CKD-EPI 2021: 14.1 ML/MIN/1.73
ERYTHROCYTE [DISTWIDTH] IN BLOOD BY AUTOMATED COUNT: 16 % (ref 12.3–15.4)
GLUCOSE BLDC GLUCOMTR-MCNC: 189 MG/DL (ref 70–130)
GLUCOSE BLDC GLUCOMTR-MCNC: 199 MG/DL (ref 70–130)
GLUCOSE BLDC GLUCOMTR-MCNC: 246 MG/DL (ref 70–130)
GLUCOSE SERPL-MCNC: 192 MG/DL (ref 65–99)
HCT VFR BLD AUTO: 27.2 % (ref 34–46.6)
HGB BLD-MCNC: 8.8 G/DL (ref 12–15.9)
MAGNESIUM SERPL-MCNC: 2.9 MG/DL (ref 1.6–2.4)
MCH RBC QN AUTO: 27.4 PG (ref 26.6–33)
MCHC RBC AUTO-ENTMCNC: 32.4 G/DL (ref 31.5–35.7)
MCV RBC AUTO: 84.7 FL (ref 79–97)
MRSA DNA SPEC QL NAA+PROBE: NORMAL
PLATELET # BLD AUTO: 211 10*3/MM3 (ref 140–450)
PMV BLD AUTO: 10.5 FL (ref 6–12)
POTASSIUM SERPL-SCNC: 4.7 MMOL/L (ref 3.5–5.2)
QT INTERVAL: 530 MS
QTC INTERVAL: 530 MS
RBC # BLD AUTO: 3.21 10*6/MM3 (ref 3.77–5.28)
SODIUM SERPL-SCNC: 137 MMOL/L (ref 136–145)
WBC NRBC COR # BLD AUTO: 14.34 10*3/MM3 (ref 3.4–10.8)

## 2024-04-14 PROCEDURE — 83735 ASSAY OF MAGNESIUM: CPT | Performed by: INTERNAL MEDICINE

## 2024-04-14 PROCEDURE — 93306 TTE W/DOPPLER COMPLETE: CPT

## 2024-04-14 PROCEDURE — 87040 BLOOD CULTURE FOR BACTERIA: CPT | Performed by: INTERNAL MEDICINE

## 2024-04-14 PROCEDURE — 63710000001 INSULIN LISPRO (HUMAN) PER 5 UNITS: Performed by: INTERNAL MEDICINE

## 2024-04-14 PROCEDURE — 97530 THERAPEUTIC ACTIVITIES: CPT

## 2024-04-14 PROCEDURE — 63710000001 INSULIN GLARGINE PER 5 UNITS: Performed by: INTERNAL MEDICINE

## 2024-04-14 PROCEDURE — 80048 BASIC METABOLIC PNL TOTAL CA: CPT | Performed by: INTERNAL MEDICINE

## 2024-04-14 PROCEDURE — 85027 COMPLETE CBC AUTOMATED: CPT | Performed by: INTERNAL MEDICINE

## 2024-04-14 PROCEDURE — 87641 MR-STAPH DNA AMP PROBE: CPT | Performed by: INTERNAL MEDICINE

## 2024-04-14 PROCEDURE — 97162 PT EVAL MOD COMPLEX 30 MIN: CPT

## 2024-04-14 PROCEDURE — 25010000002 DAPTOMYCIN PER 1 MG: Performed by: INTERNAL MEDICINE

## 2024-04-14 PROCEDURE — 99223 1ST HOSP IP/OBS HIGH 75: CPT | Performed by: INTERNAL MEDICINE

## 2024-04-14 PROCEDURE — 82948 REAGENT STRIP/BLOOD GLUCOSE: CPT

## 2024-04-14 PROCEDURE — 25010000002 AMIODARONE IN DEXTROSE 5% 360-4.14 MG/200ML-% SOLUTION

## 2024-04-14 RX ORDER — LIDOCAINE 4 G/G
2 PATCH TOPICAL
Status: DISCONTINUED | OUTPATIENT
Start: 2024-04-14 | End: 2024-04-14 | Stop reason: HOSPADM

## 2024-04-14 RX ORDER — INSULIN LISPRO 100 [IU]/ML
2-9 INJECTION, SOLUTION INTRAVENOUS; SUBCUTANEOUS
Qty: 10 ML | Refills: 0 | Status: SHIPPED | OUTPATIENT
Start: 2024-04-14

## 2024-04-14 RX ORDER — AMIODARONE HYDROCHLORIDE 200 MG/1
200 TABLET ORAL EVERY 12 HOURS SCHEDULED
Qty: 2 TABLET | Refills: 0 | Status: SHIPPED | OUTPATIENT
Start: 2024-04-14

## 2024-04-14 RX ORDER — FUROSEMIDE 40 MG/1
40 TABLET ORAL DAILY
Status: DISCONTINUED | OUTPATIENT
Start: 2024-04-15 | End: 2024-04-14 | Stop reason: HOSPADM

## 2024-04-14 RX ORDER — LIDOCAINE 4 G/G
2 PATCH TOPICAL
Qty: 2 EACH | Refills: 0 | Status: SHIPPED | OUTPATIENT
Start: 2024-04-15

## 2024-04-14 RX ORDER — AMIODARONE HYDROCHLORIDE 200 MG/1
200 TABLET ORAL EVERY 12 HOURS SCHEDULED
Status: DISCONTINUED | OUTPATIENT
Start: 2024-04-14 | End: 2024-04-14 | Stop reason: HOSPADM

## 2024-04-14 RX ADMIN — AMIODARONE HYDROCHLORIDE 0.5 MG/MIN: 1.8 INJECTION, SOLUTION INTRAVENOUS at 08:08

## 2024-04-14 RX ADMIN — ACETAMINOPHEN 650 MG: 325 TABLET ORAL at 01:56

## 2024-04-14 RX ADMIN — INSULIN GLARGINE 10 UNITS: 100 INJECTION, SOLUTION SUBCUTANEOUS at 08:36

## 2024-04-14 RX ADMIN — METOPROLOL SUCCINATE 50 MG: 50 TABLET, EXTENDED RELEASE ORAL at 08:09

## 2024-04-14 RX ADMIN — INSULIN LISPRO 2 UNITS: 100 INJECTION, SOLUTION INTRAVENOUS; SUBCUTANEOUS at 08:19

## 2024-04-14 RX ADMIN — DAPTOMYCIN 550 MG: 500 INJECTION, POWDER, LYOPHILIZED, FOR SOLUTION INTRAVENOUS at 15:10

## 2024-04-14 RX ADMIN — ASPIRIN 81 MG: 81 TABLET, CHEWABLE ORAL at 08:09

## 2024-04-14 RX ADMIN — LOSARTAN POTASSIUM 50 MG: 50 TABLET, FILM COATED ORAL at 08:09

## 2024-04-14 RX ADMIN — ACETAMINOPHEN 650 MG: 325 TABLET ORAL at 08:09

## 2024-04-14 RX ADMIN — FERROUS SULFATE TAB 325 MG (65 MG ELEMENTAL FE) 325 MG: 325 (65 FE) TAB at 08:10

## 2024-04-14 RX ADMIN — AMIODARONE HYDROCHLORIDE 200 MG: 200 TABLET ORAL at 08:36

## 2024-04-14 RX ADMIN — ACETAMINOPHEN 650 MG: 325 TABLET ORAL at 13:34

## 2024-04-14 RX ADMIN — INSULIN LISPRO 4 UNITS: 100 INJECTION, SOLUTION INTRAVENOUS; SUBCUTANEOUS at 12:12

## 2024-04-14 RX ADMIN — Medication 10 ML: at 08:11

## 2024-04-14 RX ADMIN — LIDOCAINE 2 PATCH: 4 PATCH TOPICAL at 11:49

## 2024-04-14 RX ADMIN — FUROSEMIDE 20 MG: 20 TABLET ORAL at 08:09

## 2024-04-14 RX ADMIN — APIXABAN 2.5 MG: 2.5 TABLET, FILM COATED ORAL at 08:10

## 2024-04-14 RX ADMIN — INSULIN LISPRO 2 UNITS: 100 INJECTION, SOLUTION INTRAVENOUS; SUBCUTANEOUS at 17:57

## 2024-04-14 NOTE — DISCHARGE SUMMARY
Date of Discharge:  4/14/2024    Discharge Diagnoses:  V. tach/V-fib arrest  Hip/pelvic/back pain  Chest wall pain post CPR  Coronary artery disease  Cardiomyopathy with reduced left ventricular ejection fraction  Valvular heart disease with moderate to severe mitral regurgitation mild aortic stenosis  Sick sinus syndrome with permanent pacemaker  Pulmonary hypertension  Diabetes mellitus type 2  Elevated troponin non-ST elevation MI type II  Chronic kidney disease stage IIIb  Pulmonary nodule left lower lobe  Bacteremic sepsis staff aureus  Hypertension  Hyperlipidemia  Obstructive sleep apnea      Hospital Course  Patient is a 83 y.o. female presented with back pain actually very low back actually it sort of at the hips bilaterally that have present for years but progressive and limiting her mobility.  She went to [facility] while there she suffered a cardiac arrest V. tach and V-fib.  Dr. Jovel cardiology saw her wanted her transferred she was transferred to [facility] Dr. Irizarry cardiac EP saw her and wants her now transferred to [facility] for ICD placement.  He has arranged for a bed at [facility] and she is agreeable to going.  She had supplies a had fever leukocytosis and elevated procalcitonin blood cultures are growing Staphylococcus aureus from [facility] ID is pending.  Echocardiogram has been done but report is still pending to rule out endocarditis.  She has a pacer leads that are not MRI compatible who are planning on getting CT scan of her hip and pelvis trying to rule out any infection there that is accounting for this severe pain given her bacteremia.  That has not been completed at transfer.  Also patient was reported from the other hospital to have a 11 mm pulmonary nodule in the right lower lobe.  She probably should have a PET scan to evaluate this.  Patient does have chronic kidney disease stage III her creatinine has gone up some with the all of this and coarser renal functions can need to  be monitored she is a diabetic blood sugars have been up and down a little bit.  She has obstructive sleep apnea family supposed to be bringing her home PAP machine to her.    Procedures Performed         Consults:   Consults       Date and Time Order Name Status Description    4/14/2024  8:37 AM Inpatient Nephrology Consult      4/13/2024  5:12 PM Inpatient Infectious Diseases Consult Completed     4/13/2024  5:12 PM Inpatient Cardiology Consult Completed             Pertinent Test Results:   Labs:  Results from last 7 days   Lab Units 04/14/24 0430 04/13/24 1827 04/13/24 0223   GLUCOSE mg/dL 192* 222*  --    SODIUM mmol/L 137 137  --    POTASSIUM mmol/L 4.7 5.0  --    MAGNESIUM mg/dL 2.9* 2.8* 2.6*   CO2 mmol/L 17.0* 20.0*  --    CHLORIDE mmol/L 105 101  --    ANION GAP mmol/L 15.0 16.0*  --    CREATININE mg/dL 3.18* 2.78*  --    BUN mg/dL 69* 66*  --    BUN / CREAT RATIO  21.7 23.7  --    CALCIUM mg/dL 8.5* 8.8  --    ALK PHOS U/L  --  135*  --    TOTAL PROTEIN g/dL  --  6.5  --    ALT (SGPT) U/L  --  164*  --    AST (SGOT) U/L  --  115*  --    BILIRUBIN mg/dL  --  0.4  --    ALBUMIN g/dL  --  3.4*  --    GLOBULIN gm/dL  --  3.1  --      Estimated Creatinine Clearance: 15.2 mL/min (A) (by C-G formula based on SCr of 3.18 mg/dL (H)).  Results from last 7 days   Lab Units 04/13/24 1827   IONIZED CALCIUM mg/dL 4.8     Results from last 7 days   Lab Units 04/14/24 0430 04/13/24 1827   WBC 10*3/mm3 14.34* 14.01*   RBC 10*6/mm3 3.21* 3.37*   HEMOGLOBIN g/dL 8.8* 9.2*   HEMATOCRIT % 27.2* 29.8*   MCV fL 84.7 88.4   MCH pg 27.4 27.3   MCHC g/dL 32.4 30.9*   RDW % 16.0* 16.2*   RDW-SD fl 49.8 53.1   MPV fL 10.5 10.5   PLATELETS 10*3/mm3 211 193         Results from last 7 days   Lab Units 04/13/24 2110 04/13/24 1827   HSTROP T ng/L 128* 134*         Results from last 7 days   Lab Units 04/13/24 1827 04/12/24  1949   TSH uIU/mL 4.140 1.881     Results from last 7 days   Lab Units 04/13/24 1827 04/13/24  0155    LACTATE mmol/L 1.7  --    PROCALCITONIN ng/mL 3.61* 1.62*     Results from last 7 days   Lab Units 04/13/24  1827 04/13/24  0223 04/12/24  1949   INR  1.82* 1.94* 1.68*       Imaging Results (Last 72 Hours)       ** No results found for the last 72 hours. **                  Condition on Discharge:      Vital Signs  Temp:  [97.5 °F (36.4 °C)-98.3 °F (36.8 °C)] 97.9 °F (36.6 °C)  Heart Rate:  [60-66] 60  Resp:  [16-20] 16  BP: (118-146)/(47-79) 132/53    Physical Exam:  General Appearance: Morbidly obese white female she is resting in bed she does not look in acute distress  Eyes: Conjunctiva are clear and anicteric pupils are equal and reactive to light  ENT: Mucous membranes are little dry no erythema no exudates she has a Mallampati type II airway, nasal septum midline  Neck: No lymphadenopathy or thyromegaly no jugular venous tension and trachea midline  Lungs: Clear nonlabored symmetric expansion no wheezes no rales no rhonchi  Cardiac: Regular rate rhythm murmur loudest in the left upper sternal border  Abdomen: Soft nontender no palpable hepatosplenomegaly or masses  : Not examined  Musculoskeletal: Does not want to move much for pain she has some pain along her costochondral junction areas bilaterally multiple sites  Skin: No jaundice no petechiae skin is warm and dry  Neuro: She is alert and oriented she is cooperative following commands grossly intact  Extremities/P Vascular: No clubbing no cyanosis and no edema she has palpable radial pulses her dorsalis pedis pulses are present they are not as strong as the radial pulses.  MSE: She is pleasant and appropriate         Discharge Disposition      Discharge Medications     Discharge Medications        Continue These Medications        Instructions Start Date   OneTouch Delica Plus [id number] misc   1 each, Does not apply, Daily, Dx e11.9      Pen Needles 32G X 4 MM misc   1 each, Does not apply, Daily, Use with vicotza dx e 11.59             ASK your  doctor about these medications        Instructions Start Date   acetaminophen 325 MG tablet  Commonly known as: TYLENOL   650 mg, Oral, Every 6 Hours PRN      AKWA TEARS OP   1 Application, Ophthalmic, Every 4 Hours PRN      apixaban 2.5 MG tablet tablet  Commonly known as: ELIQUIS   2.5 mg, Oral, 2 Times Daily      ascorbic acid 500 MG tablet  Commonly known as: VITAMIN C   500 mg, Oral, Every Other Day      Aspirin Low Dose 81 MG EC tablet  Generic drug: aspirin   GIVE 1 TABLET BY MOUTH AT BEDTIME      atorvastatin 10 MG tablet  Commonly known as: LIPITOR   TAKE 1/2 TABLET (5mg) BY MOUTH EVERY MORNING      cetirizine 10 MG tablet  Commonly known as: zyrTEC   10 mg, Oral, Daily      cholecalciferol 25 MCG (1000 UT) tablet   TAKE 2 TABLETS ( 2000 UNITS) BY MOUTH EVERY MORNING      coenzyme Q10 100 MG capsule   100 mg, Oral, Daily      Diclofenac Sodium 1 % gel gel  Commonly known as: VOLTAREN   4 g, Topical, 4 Times Daily PRN      Alba-C tablet   TAKE 1 TABLET BY MOUTH EVERY OTHER DAY      febuxostat 80 MG tablet tablet  Commonly known as: ULORIC   40 mg, Oral, Daily      ferrous sulfate 325 (65 Fe) MG tablet   1 tablet, Every Other Day, Monday, Wednesday, Friday      FLORANEX PO   1 tablet, Oral, Daily, 1 million cell Tab per tablet Generic drug: Lactobacillus acidoph-L.titogar      furosemide 40 MG tablet  Commonly known as: LASIX   Take 1 tablet BY MOUTH EVERY DAY      hydroCHLOROthiazide 12.5 MG tablet   12.5 mg, Oral, Daily      ibuprofen 200 MG tablet  Commonly known as: ADVIL,MOTRIN   200 mg, Oral, 2 Times Daily PRN      insulin aspart 100 UNIT/ML injection  Commonly known as: novoLOG   Sliding Scale       LANTUS SC   70 Units, Subcutaneous, Nightly, Lantus OptiClik Cartridge 100 units/mL subcutaneous solution       Liraglutide 18 MG/3ML injection pen  Commonly known as: SAXENDA   0.6 mg, Subcutaneous, Daily, Trade name Victoza      Loratadine 10 MG capsule   10 mg, Oral, Daily      losartan 100 MG  tablet  Commonly known as: COZAAR   50 mg, Oral, Daily      meclizine 25 MG tablet  Commonly known as: ANTIVERT   12.5 mg, Oral, 3 Times Daily PRN      metoprolol succinate XL 50 MG 24 hr tablet  Commonly known as: TOPROL-XL   50 mg, Oral, 2 Times Daily      metoprolol tartrate 25 MG tablet  Commonly known as: LOPRESSOR   50 mg, Oral, 2 Times Daily      multivitamin tablet tablet  Commonly known as: THERAGRAN   1 tablet, Oral, Daily      nitroglycerin 0.4 MG SL tablet  Commonly known as: NITROSTAT   0.4 mg, Sublingual, As Needed      nystatin 761968 UNIT/GM cream  Commonly known as: MYCOSTATIN   1 Application, Topical, 2 Times Daily      OneTouch Ultra test strip  Generic drug: glucose blood   Check blood sugar daily dx e11.9      ranibizumab 0.3 MG/0.05ML injection  Commonly known as: LUCENTIS   0.3 mg, Once, Every 4 weeks.       sodium chloride 0.65 % nasal spray   2 sprays, Nasal, As Needed      spironolactone 25 MG tablet  Commonly known as: ALDACTONE   25 mg, Oral, Daily      traMADol 50 MG tablet  Commonly known as: ULTRAM   50 mg, Oral, Every 8 Hours PRN      Trulicity 0.75 MG/0.5ML solution pen-injector  Generic drug: Dulaglutide   0.75 mg, Subcutaneous, Weekly      vitamin B-12 2500 MCG sublingual tablet tablet  Commonly known as: CYANOCOBALAMIN   PLACE 1 TABLET UNDER THE TONGUE EVERY MORNING      white petrolatum ointment   1 Application, Topical, As Needed               Discharge Diet: Healthy heart consistent carbohydrate    Activity at Discharge:     Follow-up Appointments  No future appointments.      Test Results Pending at Discharge       Rikki Thayer Jr, MD  04/14/24  14:57 EDT    Time:

## 2024-04-14 NOTE — OUTREACH NOTE
Prep Survey      Flowsheet Row Responses   [facility] facility patient discharged from? Non-[facility]   Is LACE score < 7 ? Non- Discharge   Eligibility [facility]   Date of Admission 04/12/24   Date of Discharge 04/13/24   Discharge diagnosis Cellulitis of buttock   Does the patient have one of the following disease processes/diagnoses(primary or secondary)? Other   Prep survey completed? Yes            Leyla BARRETT - Registered Nurse

## 2024-04-14 NOTE — PROGRESS NOTES
Cardiology/Electrophysiology Progress Note      Patient Name: Yola Dorman  Age/Sex: 83 y.o. female  : 1940  MRN: [id number]      Day of Service: 24       Chief Complaint/Follow-up:     Interval History:     S: Patient seen and examined. Tele Reviewed. No acute issues.    Patient feeling much better.     Device interrogated - VT on day of arrest - 8 episodes - no other episodes.    Troponins flat.      Temp:  [97.5 °F (36.4 °C)-98.3 °F (36.8 °C)] 97.9 °F (36.6 °C)  Heart Rate:  [60-66] 60  Resp:  [16-20] 16  BP: (118-146)/(47-79) 132/53     AO x 3, NAD  Eyes PERRL, EOMI  Neck supple, no JVD  CTA  RRR - paced. RV heave. No r/g.  BS intact.  No edema  Skin warm and dry      ECG/TELE: Reviewed.      Results from last 7 days   Lab Units 24  0430 24  1827   SODIUM mmol/L 137 137   POTASSIUM mmol/L 4.7 5.0   CHLORIDE mmol/L 105 101   CO2 mmol/L 17.0* 20.0*   BUN mg/dL 69* 66*   CREATININE mg/dL 3.18* 2.78*   GLUCOSE mg/dL 192* 222*   CALCIUM mg/dL 8.5* 8.8     Results from last 7 days   Lab Units 24  0430 24  1827   WBC 10*3/mm3 14.34* 14.01*   HEMOGLOBIN g/dL 8.8* 9.2*   HEMATOCRIT % 27.2* 29.8*   PLATELETS 10*3/mm3 211 193     Results from last 7 days   Lab Units 24  1827 24  0223 24  1949   INR  1.82* 1.94* 1.68*       Current Medications:   Scheduled Meds:amiodarone, 200 mg, Oral, Q12H  apixaban, 2.5 mg, Oral, Q12H  aspirin, 81 mg, Oral, Daily  atorvastatin, 10 mg, Oral, Nightly  cefTRIAXone, 2,000 mg, Intravenous, Q24H  febuxostat, 40 mg, Oral, Q48H  ferrous sulfate, 325 mg, Oral, Daily With Breakfast  furosemide, 20 mg, Oral, Daily  insulin glargine, 10 Units, Subcutaneous, Daily  insulin lispro, 2-9 Units, Subcutaneous, 4x Daily AC & at Bedtime  Lidocaine, 2 patch, Transdermal, Q24H  [Held by provider] losartan, 50 mg, Oral, Q24H  metoprolol succinate XL, 50 mg, Oral, Q24H  senna-docusate sodium, 2 tablet, Oral, BID  sodium chloride, 10 mL,  Intravenous, Q12H      Continuous Infusions:         Cardiac arrest       A/P: 82 yo female with history of Cardiac Arrest.     Cardiac Arrest - in patient with history of known ICM - scar based reentry - nothing to suggest ACS. Will review to see when had last ischemia work-up. On Amiodarone - can continue 24 hour load - then change to 200 mg bid. Keep K > 4, Mg > 2. Will require consideration of upgrade of device for secondary prophylaxis. As not ACS and primarily an arrhythmic issue - would rather defer on [facility] as she is CKD stage III - will consider stress test. Will need to transfer to [facility] for ICD upgrade.  Pacemaker - implanted for SSS - device interrogated and reviewed by myself as noted above.  NSTEMI - secondary to defibrillation - not suggestive of ACS - type 2.  Atrial Fibrillation - paroxysmal - elevated [id number] score of at least 5 - on Eliquis. In SR.  Chest wall Pain - due to cracked ribs from CPR - pain medications and encourage IS to prevent atelectasis. Improved.  HLD - on Statin treat to goal.  HTN - controlled.     Thank you for allowing me to participate in the care of this patient. Warmest Regards.           > 35 minutes of CCU time spent.    Tad Garg DO  04/14/24  10:58 EDT

## 2024-04-14 NOTE — THERAPY EVALUATION
Patient Name: Yola Dorman  : 1940    MRN: [id number]                              Today's Date: 2024       Admit Date: 2024    Visit Dx: No diagnosis found.  Patient Active Problem List   Diagnosis    Sleep apnea    Hyperlipidemia    Diabetes mellitus    Arthritis    Allergic    Cataract    Anemia    Aortic valve stenosis    Atrioventricular block    Benign essential hypertension    Chronic coronary artery disease    Presence of cardiac pacemaker    Renal insufficiency    Sick sinus syndrome    Allergic rhinitis    Atrial fibrillation    Atherosclerosis of both carotid arteries    Gout    History of cardiomyopathy    Peripheral vascular disease    Ischemic heart disease    Osteoporosis    Stage 4 chronic kidney disease    Anemia    Type 2 diabetes mellitus    Hyperlipidemia    Cardiac pacemaker in situ    Obstructive sleep apnea    Polyneuropathy associated with underlying disease    Stage 3b chronic kidney disease    Diabetic ulcer of left foot associated with diabetes mellitus due to underlying condition, limited to breakdown of skin    Essential tremor    Cardiac arrest     Past Medical History:   Diagnosis Date    Allergic     Arthritis     Cataract     CPAP (continuous positive airway pressure) dependence     Diabetes mellitus     Hyperlipidemia     Hypertension     Shingles     Skin cancer, basal cell     Sleep apnea      Past Surgical History:   Procedure Laterality Date    APPENDECTOMY      CHOLECYSTECTOMY      CORONARY ANGIOPLASTY      CORONARY ARTERY BYPASS GRAFT      EYE SURGERY      FOOT WOUND CLOSURE      HYSTERECTOMY      INCISION AND DRAINAGE FOOT      INCISION AND DRAINAGE SHOULDER      PACEMAKER IMPLANTATION      SKIN CANCER EXCISION      basil cell     TOE AMPUTATION      TONSILLECTOMY        General Information       Row Name 24 1139          Physical Therapy Time and Intention    Document Type evaluation  -SM     Mode of Treatment individual therapy;physical  therapy  -       Row Name 04/14/24 1139          General Information    Patient Profile Reviewed yes  -SM     Prior Level of Function independent:  -     Existing Precautions/Restrictions fall  -       Row Name 04/14/24 1139          Living Environment    People in Home facility resident  longterm  -       Row Name 04/14/24 1139          Home Main Entrance    Number of Stairs, Main Entrance none  -       Row Name 04/14/24 1139          Cognition    Orientation Status (Cognition) oriented x 4  -       Row Name 04/14/24 1139          Safety Issues, Functional Mobility    Impairments Affecting Function (Mobility) balance;strength;endurance/activity tolerance;pain  -               User Key  (r) = Recorded By, (t) = Taken By, (c) = Cosigned By      Initials Name Provider Type     Sonya Squires PT Physical Therapist                   Mobility       Row Name 04/14/24 1140          Bed Mobility    Bed Mobility supine-sit  -SM     Supine-Sit [place] (Bed Mobility) moderate assist (50% patient effort);verbal cues  -     Assistive Device (Bed Mobility) head of bed elevated;bed rails;draw sheet  -     Comment, (Bed Mobility) Increased time. VCs for sequencing  -       Row Name 04/14/24 1140          Bed-Chair Transfer    Bed-Chair [place] (Transfers) minimum assist (75% patient effort);verbal cues;nonverbal cues (demo/gesture)  -     Assistive Device (Bed-Chair Transfers) walker, front-wheeled  -[facility] Name 04/14/24 1140          Sit-Stand Transfer    Sit-Stand [place] (Transfers) minimum assist (75% patient effort);moderate assist (50% patient effort);verbal cues  -     Assistive Device (Sit-Stand Transfers) walker, front-wheeled  -     Comment, (Sit-Stand Transfer) From EOB  -               User Key  (r) = Recorded By, (t) = Taken By, (c) = Cosigned By      Initials Name Provider Type     Sonya Squires PT Physical Therapist                   Obj/Interventions       [facility]  Name 04/14/24 1140          Range of Motion Comprehensive    General Range of Motion bilateral lower extremity ROM WFL  -       Row Name 04/14/24 1140          Strength Comprehensive (MMT)    General Manual Muscle Testing (MMT) Assessment lower extremity strength deficits identified  -     Comment, General Manual Muscle Testing (MMT) Assessment B LEs grossly 3/5  -       Row Name 04/14/24 1140          Balance    Balance Assessment sitting static balance;sitting dynamic balance;sit to stand dynamic balance;standing static balance;standing dynamic balance  -     Static Sitting Balance contact guard  -     Dynamic Sitting Balance contact guard  -     Position, Sitting Balance sitting edge of bed  -     Sit to Stand Dynamic Balance minimal assist;moderate assist;verbal cues  -     Static Standing Balance minimal assist;contact guard  -SM     Dynamic Standing Balance minimal assist  -     Position/Device Used, Standing Balance supported;walker, front-wheeled  -     Balance Interventions standing;sitting;sit to stand;supported;static;dynamic  -               User Key  (r) = Recorded By, (t) = Taken By, (c) = Cosigned By      Initials Name Provider Type     Sonya Squires, PT Physical Therapist                   Goals/Plan       Row Name 04/14/24 1149          Bed Mobility Goal 1 (PT)    Activity/Assistive Device (Bed Mobility Goal 1, PT) bed mobility activities, all  -     [place] Level/Cues Needed (Bed Mobility Goal 1, PT) contact guard required  -     Time Frame (Bed Mobility Goal 1, PT) 2 weeks  -       Row Name 04/14/24 114          Transfer Goal 1 (PT)    Activity/Assistive Device (Transfer Goal 1, PT) sit-to-stand/stand-to-sit;bed-to-chair/chair-to-bed  -     [place] Level/Cues Needed (Transfer Goal 1, PT) contact guard required  -     Time Frame (Transfer Goal 1, PT) 2 weeks  -       Row Name 04/14/24 1145          Gait Training Goal 1 (PT)    Activity/Assistive  Device (Gait Training Goal 1, PT) gait (walking locomotion);walker, rolling  -SM     [place] Level (Gait Training Goal 1, PT) contact guard required  -SM     Distance (Gait Training Goal 1, PT) 50ft  -SM     Time Frame (Gait Training Goal 1, PT) 2 weeks  -SM               User Key  (r) = Recorded By, (t) = Taken By, (c) = Cosigned By      Initials Name Provider Type     Sonya Squires, PT Physical Therapist                   Clinical Impression       Row Name 04/14/24 1144          Pain    Pain Location - abdomen  -     Pre/Posttreatment Pain Comment Patient did not rate pain  -SM     Pain Intervention(s) Rest;Repositioned  -       Row Name 04/14/24 1144          Plan of Care Review    Plan of Care Reviewed With patient  -     Outcome Evaluation Patient is an 83 y.o female who presented to [facility] due to cardiac arrest. Patient seen for PT evaluation this AM in CICU. Patient AOx4. Patient reports she is legally blind but able to see enough to get around and manage on her own at baseline. Patient lives at an [facility] and uses a rollator for ambulation. Patient reports she recently had a debriedment on her left foot on Thursday and was not given any weightbearing restrictions. Patient required modA to sit up to EOB and scoot out to place feet on the floor. Patient sat at EOB for several minutes with CGA/SBA. Patient performed STS from EOB with [place]/modA and support of rwx. Patient took a few shuffled steps to go from bed to chair with rwx and [place]. Patient quick to fatigue following transfer. Discussed possible need for rehab placement at d/c and patient agreeable. Acute PT will continue to monitor and progress as able to address functional mobility deficits.  -       Row Name 04/14/24 1144          Therapy Assessment/Plan (PT)    Rehab Potential (PT) fair, will monitor progress closely  -     Criteria for Skilled Interventions Met (PT) yes  -     Therapy Frequency (PT) 6 times/wk  -       Row Name  04/14/24 1144          Vital Signs    Pre Patient Position Supine  -SM     Intra Patient Position Standing  -SM     Post Patient Position Sitting  -SM       Row Name 04/14/24 1144          Positioning and Restraints    Pre-Treatment Position in bed  -SM     Post Treatment Position chair  -SM     In Chair notified nsg;reclined;call light within reach;encouraged to call for assist;patient within staff view;with family/caregiver  -               User Key  (r) = Recorded By, (t) = Taken By, (c) = Cosigned By      Initials Name Provider Type    Sonya Waters PT Physical Therapist                   Outcome Measures       Row Name 04/14/24 1150 04/14/24 0800       How much help from another person do you currently need...    Turning from your back to your side while in flat bed without using bedrails? 2  -SM 2  -JL    Moving from lying on back to sitting on the side of a flat bed without bedrails? 2  -SM 1  -JL    Moving to and from a bed to a chair (including a wheelchair)? 3  -SM 2  -JL    Standing up from a chair using your arms (e.g., wheelchair, bedside chair)? 2  -SM 2  -JL    Climbing 3-5 steps with a railing? 1  -SM 1  -JL    To walk in hospital room? 2  -SM 2  -JL    AM-PAC 6 Clicks Score (PT) 12  -SM 10  -JL    Highest Level of Mobility Goal 4 --> Transfer to chair/commode  -SM 4 --> Transfer to chair/commode  -JL      Row Name 04/14/24 1150          Functional Assessment    Outcome Measure Options AM-PAC 6 Clicks Basic Mobility (PT)  -               User Key  (r) = Recorded By, (t) = Taken By, (c) = Cosigned By      Initials Name Provider Type    Argentina Oneill RN Registered Nurse    Sonya Waters, SRIKANTH Physical Therapist                                 Physical Therapy Education       Title: PT OT SLP Therapies (Done)       Topic: Physical Therapy (Done)       Point: Mobility training (Done)       Learning Progress Summary             Patient Acceptance, E, VU by LIANA at 4/14/2024 1150                          Point: Home exercise program (Done)       Learning Progress Summary             Patient Acceptance, E, VU by  at 4/14/2024 1150                         Point: Body mechanics (Done)       Learning Progress Summary             Patient Acceptance, E, VU by  at 4/14/2024 1150                         Point: Precautions (Done)       Learning Progress Summary             Patient Acceptance, E, VU by  at 4/14/2024 1150                                         User Key       Initials Effective Dates Name Provider Type Discipline     05/02/22 -  Sonya Squires, PT Physical Therapist PT                  PT Recommendation and Plan     Plan of Care Reviewed With: patient  Outcome Evaluation: Patient is an 83 y.o female who presented to [facility] due to cardiac arrest. Patient seen for PT evaluation this AM in CICU. Patient AOx4. Patient reports she is legally blind but able to see enough to get around and manage on her own at baseline. Patient lives at an [facility] and uses a rollator for ambulation. Patient reports she recently had a debriedment on her left foot on Thursday and was not given any weightbearing restrictions. Patient required modA to sit up to EOB and scoot out to place feet on the floor. Patient sat at EOB for several minutes with CGA/SBA. Patient performed STS from EOB with [place]/modA and support of rwx. Patient took a few shuffled steps to go from bed to chair with rwx and [place]. Patient quick to fatigue following transfer. Discussed possible need for rehab placement at d/c and patient agreeable. Acute PT will continue to monitor and progress as able to address functional mobility deficits.     Time Calculation:         PT Charges       Row Name 04/14/24 1153             Time Calculation    Start Time 0926  -      Stop Time 0947  -      Time Calculation (min) 21 min  -      PT Received On 04/14/24  -      PT - Next Appointment 04/15/24  -      PT Goal Re-Cert Due Date 04/28/24  -          Time Calculation- PT    Total Timed Code Minutes- PT 15 minute(s)  -SM         Timed Charges    34487 - PT Therapeutic Activity Minutes 15  -SM         Total Minutes    Timed Charges Total Minutes 15  -SM       Total Minutes 15  -SM                User Key  (r) = Recorded By, (t) = Taken By, (c) = Cosigned By      Initials Name Provider Type     Sonya Squires PT Physical Therapist                  Therapy Charges for Today       Code Description Service Date Service Provider Modifiers Qty    46285899240 HC PT THERAPEUTIC ACT EA 15 MIN 4/14/2024 Sonya Squires, PT GP 1    29866661597 HC PT EVAL MOD COMPLEXITY 3 4/14/2024 Sonya Squires, PT GP 1            PT G-Codes  Outcome Measure Options: AM-PAC 6 Clicks Basic Mobility (PT)  AM-PAC 6 Clicks Score (PT): 12  PT Discharge Summary  Anticipated Discharge Disposition (PT): skilled nursing facility, inpatient rehabilitation facility    Sonya Squires PT  4/14/2024

## 2024-04-14 NOTE — CONSULTS
[facility] Consult Note      Patient Name: Yola Dorman  : 1940  MRN: [id number]  Primary Care Physician:  Rosalind Blake MD  Referring Physician: No Known Provider  Date of admission: 2024    Subjective     Reason for Consult:  MEDINA on CKD4    HPI:   Yola Dorman is a 83 y.o. female with CKD4 followed by Dr. Kanu Bennett of our group (last seen in 2023), admitted yesterday from [facility] at the request of her cardiologist for further evaluation of episode of ventricular tachycardia/ventricular fibrillation with arrest.  Her CKD4 is attributed to diabetic nephropathy; baseline SCr 1.7-1.9, and SCR on arrival to [facility] yesterday was 2.8, with value 3.2 today.      PMH notable for hypertension, DM2, CAD/CABG with prior MI, PAF, recurrent CHF, PPM, pulmonary nodules, and sleep apena.     She had negative viral studies for Covid, flu, RSV.  CT abdomen with contrast identified incidental finding of pulmonary nodule and effusions. No other infectious source located. Emperic abx started.  Outside blood cultures reveal Staph aureus.  CT chest also with contrast revealed small pleural effusions and pulmonary nodule.    She reports feeling better today. She is sitting up in the chair and eating. Oral intake remains poor, but no N/V.  She denies any chest pains, shortness of breath, orthopnea, PND, or bladder symptoms. Discomfort when being turned likely due to chest compressions.       Review of Systems:   14 point review of systems is otherwise negative except for mentioned above on HPI    Personal History     Past Medical History:   Diagnosis Date    Allergic     Arthritis     Cataract     CPAP (continuous positive airway pressure) dependence     Diabetes mellitus     Hyperlipidemia     Hypertension     Shingles     Skin cancer, basal cell     Sleep apnea        Past Surgical History:   Procedure Laterality Date    APPENDECTOMY      CHOLECYSTECTOMY       CORONARY ANGIOPLASTY      CORONARY ARTERY BYPASS GRAFT      EYE SURGERY      FOOT WOUND CLOSURE      HYSTERECTOMY      INCISION AND DRAINAGE FOOT      INCISION AND DRAINAGE SHOULDER      PACEMAKER IMPLANTATION      SKIN CANCER EXCISION      basil cell     TOE AMPUTATION      TONSILLECTOMY         Family History: family history includes Alcohol abuse in her brother, brother, and sister; COPD in her mother; Cancer in her father, paternal aunt, paternal aunt, paternal aunt, paternal aunt, paternal uncle, paternal uncle, paternal uncle, paternal uncle, sister, and sister; Hearing loss in her brother and brother; Hypertension in her brother and sister; Hypothyroidism in her sister; Kidney cancer in her brother; Liver cancer in her sister and sister.    Social History:  reports that she has never smoked. She has never been exposed to tobacco smoke. She has never used smokeless tobacco. She reports that she does not drink alcohol and does not use drugs.    Home Medications:  Prior to Admission medications    Medication Sig Start Date End Date Taking? Authorizing Provider   Aspirin Low Dose 81 MG EC tablet GIVE 1 TABLET BY MOUTH AT BEDTIME  Patient taking differently: Daily. 12/22/21  Yes Rosalind Blake MD   atorvastatin (LIPITOR) 10 MG tablet TAKE 1/2 TABLET (5mg) BY MOUTH EVERY MORNING  Patient taking differently: Daily. 5/23/23  Yes Rosalind Blake MD   Ferrous Sulfate (IRON) 325 (65 FE) MG tablet 1 tablet Every Other Day. Monday, Wednesday, Friday   Yes ProviderRudy MD   furosemide (LASIX) 40 MG tablet Take 1 tablet BY MOUTH EVERY DAY  Patient taking differently: Take 0.5 tablets by mouth Daily. 12/1/22  Yes Rosalind Blake MD   acetaminophen (TYLENOL) 325 MG tablet Take 2 tablets by mouth Every 6 (Six) Hours As Needed for Mild Pain or Fever.    Rudy Sal MD   apixaban (ELIQUIS) 2.5 MG tablet tablet Take 1 tablet by mouth 2 (Two) Times a Day. 2/14/24   Rosalind Blake MD    ascorbic acid (VITAMIN C) 500 MG tablet Take 1 tablet by mouth Every Other Day.    Rudy Sal MD   Bioflavonoid Products (Alba-C) tablet TAKE 1 TABLET BY MOUTH EVERY OTHER DAY 5/10/22   Rosalind Blake MD   cetirizine (zyrTEC) 10 MG tablet Take 1 tablet by mouth Daily.    Rudy Sal MD   cholecalciferol (VITAMIN D3) 25 MCG (1000 UT) tablet TAKE 2 TABLETS ( 2000 UNITS) BY MOUTH EVERY MORNING 8/27/21   Rosalind Blake MD   coenzyme Q10 100 MG capsule Take 1 capsule by mouth Daily.    Rudy Sal MD   Diclofenac Sodium (VOLTAREN) 1 % gel gel Apply 4 g topically to the appropriate area as directed 4 (Four) Times a Day As Needed (apply topically).    Rudy Sal MD   Dulaglutide (Trulicity) 0.75 MG/0.5ML solution pen-injector Inject 0.75 mg under the skin into the appropriate area as directed 1 (One) Time Per Week. 4/9/24   Rosalind Blake MD   febuxostat (ULORIC) 80 MG tablet tablet Take 0.5 tablets by mouth Daily.    Rudy Sal MD   glucose blood (OneTouch Ultra) test strip Check blood sugar daily dx e11.9 11/30/21   Rosalind Blake MD   hydroCHLOROthiazide (HYDRODIURIL) 12.5 MG tablet TAKE 1 TABLET BY MOUTH ONCE DAILY 11/27/23   Rosalind Blake MD   ibuprofen (ADVIL,MOTRIN) 200 MG tablet Take 1 tablet by mouth 2 (Two) Times a Day As Needed for Mild Pain (joint pain).    Rudy Sal MD   insulin aspart (NovoLOG) 100 UNIT/ML injection Sliding Scale    Rudy Sal MD   Insulin Glargine (LANTUS SC) Inject 70 Units under the skin every night. Lantus OptiClik Cartridge 100 units/mL subcutaneous solution    Rudy Sal MD   Insulin Pen Needle (Pen Needles) 32G X 4 MM misc 1 each Daily. Use with vicotza dx e 11.59 11/23/21   Rosalind Blake MD   Lactobacillus (FLORANEX PO) Take 1 tablet by mouth Daily. 1 million cell Tab per tablet  Generic drug: Lactobacillus acidoph-REBECCA.Rudy Mccartney MD    Lancets (OneTouch Delica Plus [id number]) misc 1 each Daily. Dx e11.9 6/28/22   Rosalind Blake MD   Liraglutide (SAXENDA) 18 MG/3ML injection pen Inject 0.6 mg under the skin into the appropriate area as directed Daily. Trade name Victoza    Rudy Sal MD   Loratadine 10 MG capsule Take 1 capsule by mouth Daily.    Rudy Sal MD   losartan (COZAAR) 100 MG tablet Take 0.5 tablets by mouth Daily. 3/13/24   Rosalind Blake MD   meclizine (ANTIVERT) 25 MG tablet Take 0.5 tablets by mouth 3 (Three) Times a Day As Needed for Dizziness.    Rudy Sal MD   metoprolol succinate XL (TOPROL-XL) 50 MG 24 hr tablet Take 1 tablet by mouth 2 (Two) Times a Day.    Rudy Sal MD   metoprolol tartrate (LOPRESSOR) 25 MG tablet Take 2 tablets by mouth 2 (Two) Times a Day. 3/13/24   Rosalind Blake MD   Multiple Vitamins-Minerals (MULTIVITAMIN PO) Take 1 tablet by mouth Daily.    Rudy Sal MD   nitroglycerin (NITROSTAT) 0.4 MG SL tablet Place 1 tablet under the tongue As Needed. 3/15/17   Rudy Sal MD   nystatin (MYCOSTATIN) 211271 UNIT/GM cream Apply 1 Application topically to the appropriate area as directed 2 (Two) Times a Day.    Rudy Sal MD   Polyvinyl Alcohol (AKWA TEARS OP) Apply 1 Application to eye(s) as directed by provider Every 4 (Four) Hours As Needed (dry eyes).    Rudy Sal MD   ranibizumab (LUCENTIS) 0.3 MG/0.05ML injection 0.5 mL 1 (One) Time. Every 4 weeks.    Rudy Sal MD   sodium chloride 0.65 % nasal spray 2 sprays into the nostril(s) as directed by provider As Needed for Congestion.    Rudy Sal MD   spironolactone (ALDACTONE) 25 MG tablet Take 1 tablet by mouth Daily.    Rudy Sal MD   traMADol (ULTRAM) 50 MG tablet Take 1 tablet by mouth Every 8 (Eight) Hours As Needed for Moderate Pain. 4/12/24   Rosalind Blake MD   vitamin B-12 (CYANOCOBALAMIN) 2500 MCG  "sublingual tablet tablet PLACE 1 TABLET UNDER THE TONGUE EVERY MORNING 5/4/22   Rosalind Blake MD   white petrolatum ointment Apply 1 Application topically to the appropriate area as directed As Needed for Dry Skin.    Provider, MD Rudy       Allergies:  Allergies   Allergen Reactions    Sulfa Antibiotics      Cardiac Arrest.    Allopurinol      \"Red Man Face.\"    Amoxicillin Rash    Vancomycin Rash       Objective     Vitals:   Temp:  [97.5 °F (36.4 °C)-98.1 °F (36.7 °C)] 97.9 °F (36.6 °C)  Heart Rate:  [60-69] 60  Resp:  [16-20] 16  BP: (114-146)/() 128/109  Flow (L/min):  [2] 2    Intake/Output Summary (Last 24 hours) at 4/14/2024 1552  Last data filed at 4/14/2024 0836  Gross per 24 hour   Intake 963 ml   Output 125 ml   Net 838 ml       Physical Exam:   Constitutional: chr ill apearing, frail, oriented, overweight  HEENT: Sclera anicteric, no conjunctival injection  Neck: Supple, no carotid bruit, trachea at midline, no JVD  Respiratory: Scattered rhonchi  Cardiovascular: Paced; 2/6 M  Gastrointestinal: BS+, soft, nontender and nondistended  : Arellano catheter anchored in place  Musculoskeletal: Trace edema, no clubbing or cyanosis  Psychiatric: Appropriate affect, cooperative, oriented  Neurologic:  moving all extremities, normal speech  Skin: Warm and dry       Scheduled Meds:     amiodarone, 200 mg, Oral, Q12H  apixaban, 2.5 mg, Oral, Q12H  aspirin, 81 mg, Oral, Daily  atorvastatin, 10 mg, Oral, Nightly  DAPTOmycin, 8 mg/kg (Adjusted), Intravenous, Q48H  febuxostat, 40 mg, Oral, Q48H  ferrous sulfate, 325 mg, Oral, Daily With Breakfast  furosemide, 20 mg, Oral, Daily  insulin glargine, 10 Units, Subcutaneous, Daily  insulin lispro, 2-9 Units, Subcutaneous, 4x Daily AC & at Bedtime  Lidocaine, 2 patch, Transdermal, Q24H  [Held by provider] losartan, 50 mg, Oral, Q24H  metoprolol succinate XL, 50 mg, Oral, Q24H  senna-docusate sodium, 2 tablet, Oral, BID  sodium chloride, 10 mL, Intravenous, " Q12H      IV Meds:        Results Reviewed:   I have personally reviewed the results from the time of this admission to 4/14/2024 15:52 EDT     Lab Results   Component Value Date    GLUCOSE 192 (H) 04/14/2024    CALCIUM 8.5 (L) 04/14/2024     04/14/2024    K 4.7 04/14/2024    CO2 17.0 (L) 04/14/2024     04/14/2024    BUN 69 (H) 04/14/2024    CREATININE 3.18 (H) 04/14/2024    EGFRIFAFRI 32 (L) 04/27/2017    EGFRIFNONA 26 (L) 04/27/2017    BCR 21.7 04/14/2024    ANIONGAP 15.0 04/14/2024      Lab Results   Component Value Date    MG 2.9 (H) 04/14/2024    PHOS 5.8 (H) 04/13/2024    ALBUMIN 3.4 (L) 04/13/2024           Assessment / Plan       Cardiac arrest      ASSESSMENT:  MEDINA on [id number] (baseline SCr 1.7-1.9), prerenal and multifactorial d/t cardiac arrest/tachyarrhythmia, CHF, decreased perfusion and ATN r/t to cardiac arrest, hypertension requiring briefly vasopressor support, bacteremia/sepsis, and JEAN. 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  No hydronephrosis by imaging.  Congestion by exam and imaging.  Potassium fine and likely NAGMA.  No urine studies yet  Sepsis, with Staphylococcus bacteremia, unclear source.    Pulmonary nodules   Cardiac arrest, VT with progression to V. Fib. Significant cardiac history of hypertension, NSTEMI, PAF, SSS, CABG, congestive heart failure,   Atrial Fibrillation, rate paced, on amiodarone  CAD, eliquis, ASA,   CHF, EF 40%  Sleep Apnea, CPAP to be brought by family  Back pain, chronic, exacerbated last week. Could consider MRI lumbar spine once more stable but will defer to primary team      PLAN:  Defibrillator placement soon (possibly tomorrow)  Oral Lasix, tho raise dose to 40 mg daily  Will contact my partner at [facility] regarding imminent transfer   D/w Dr. hTayer    Thank you for involving us in the care of Yola BUSTILLOS Jose Manuelj carlos.  Please feel free to call with any questions.    Rafa Farley MD  04/14/24  15:52 EDT    [facility]  [phone]      Please note that portions of this note were completed with a voice recognition program.

## 2024-04-14 NOTE — PLAN OF CARE
Goal Outcome Evaluation:  Plan of Care Reviewed With: patient           Outcome Evaluation: Patient is an 83 y.o female who presented to [facility] due to cardiac arrest. Patient seen for PT evaluation this AM in CICU. Patient AOx4. Patient reports she is legally blind but able to see enough to get around and manage on her own at baseline. Patient lives at an [facility] and uses a rollator for ambulation. Patient reports she recently had a debriedment on her left foot on Thursday and was not given any weightbearing restrictions. Patient required modA to sit up to EOB and scoot out to place feet on the floor. Patient sat at EOB for several minutes with CGA/SBA. Patient performed STS from EOB with Juana/modA and support of rwx. Patient took a few shuffled steps to go from bed to chair with rwx and Juana. Patient quick to fatigue following transfer. Discussed possible need for rehab placement at d/c and patient agreeable. Acute PT will continue to monitor and progress as able to address functional mobility deficits.      Anticipated Discharge Disposition (PT): skilled nursing facility, inpatient rehabilitation facility

## 2024-04-14 NOTE — NURSING NOTE
Transport center from [facility] called back:    Patient will be going to M-ICU at [facility]. [address] [facility] [address].    She must stop by registration when she gets there to get room number (its already assigned).     Call report to [phone].    Face sheet was faxed to [phone].     ---------------------    Ambulance service from [facility] planning transport at 2000.

## 2024-04-14 NOTE — PLAN OF CARE
Goal Outcome Evaluation:              Outcome Evaluation: Patient remains in CICU. Placed on 2L NC while sleeping, home CPAP to be brought in today. Amio gtt orders clarified with provider, currently infusing. Pain treated with PRN medications. Patient states it only hurts while she is being turned, reports that the pain has gotten better throughout the night. Plan for echo today. VSS.

## 2024-04-14 NOTE — CONSULTS
Referring Provider: Provider, [place] [address]  Reason for Consultation: Concern for infection    Subjective   History of present illness: This is a 83-year-old female with a history of hypertension hyperlipidemia coronary artery disease status post CABG and CKD 3 and diabetes was transferred to [facility] on April 13 for cardiac evaluation due to V. tach/fib arrest.  Patient originally presented on April 12 to an outside facility with complaints of for back buttock pain.  She was found to have a white blood cell count of 15,000 and a procalcitonin of over 1 with a Tmax of 100.5.  CAT scan of the chest abdomen pelvis was obtained with no infectious etiology.  While in the hospital she developed a V-fib arrest.  She was empirically started on ceftriaxone.    Past Medical History:   Diagnosis Date    Allergic     Arthritis     Cataract     CPAP (continuous positive airway pressure) dependence     Diabetes mellitus     Hyperlipidemia     Hypertension     Sleep apnea    Coronary artery disease status post CABG  CKD 3    Past Surgical History:   Procedure Laterality Date    APPENDECTOMY      CHOLECYSTECTOMY      CORONARY ANGIOPLASTY      CORONARY ARTERY BYPASS GRAFT      EYE SURGERY      FOOT WOUND CLOSURE      HYSTERECTOMY      INCISION AND DRAINAGE FOOT      INCISION AND DRAINAGE SHOULDER      PACEMAKER IMPLANTATION      SKIN CANCER EXCISION      basil cell     TOE AMPUTATION      TONSILLECTOMY          reports that she has never smoked. She has never been exposed to tobacco smoke. She has never used smokeless tobacco. She reports that she does not drink alcohol and does not use drugs.    family history includes Alcohol abuse in her brother, brother, and sister; COPD in her mother; Cancer in her father, paternal aunt, paternal aunt, paternal aunt, paternal aunt, paternal uncle, paternal uncle, paternal uncle, paternal uncle, sister, and sister; Hearing loss in her  "brother and brother; Hypertension in her brother and sister; Hypothyroidism in her sister; Kidney cancer in her brother; Liver cancer in her sister and sister.    Allergies   Allergen Reactions    Sulfa Antibiotics      Cardiac Arrest.    Allopurinol      \"Red Man Face.\"    Amoxicillin Rash    Vancomycin Rash       Medication:  Antibiotics:  Ceftriaxone 2 g IV every 24 hours    Please refer to the medical record for a full medication list    Review of Systems  Pertinent items are noted in HPI, all other systems reviewed and negative    Objective   Vital Signs   Temp:  [97.5 °F (36.4 °C)-98.3 °F (36.8 °C)] 97.9 °F (36.6 °C)  Heart Rate:  [60-66] 60  Resp:  [16-20] 16  BP: (118-146)/(47-79) 132/53    Physical Exam:   General: In no acute distress  HEENT: Normocephalic, atraumatic, no scleral icterus.   Neck: Supple, trachea is midline  Cardiovascular: Normal rate, regular rhythm, positive systolic murmur  Respiratory: Basilar crackles  GI: Abdomen is obese, soft, nontender, nondistended, positive bowel sounds bilaterally,   Musculoskeletal: no edema, tenderness or deformity  Skin: No rashes   Extremities: No C/C  Neurological: Alert and oriented, moving all 4 extremities  Psychiatric: Normal mood and affect     Results Review:   I reviewed the patient's new clinical results.  I reviewed the patient's new imaging results and agree with the interpretation.    Lab Results   Component Value Date    WBC 14.34 (H) 04/14/2024    HGB 8.8 (L) 04/14/2024    HCT 27.2 (L) 04/14/2024    MCV 84.7 04/14/2024     04/14/2024       Lab Results   Component Value Date    GLUCOSE 192 (H) 04/14/2024    BUN 69 (H) 04/14/2024    CREATININE 3.18 (H) 04/14/2024    EGFRIFNONA 26 (L) 04/27/2017    EGFRIFAFRI 32 (L) 04/27/2017    BCR 21.7 04/14/2024    CO2 17.0 (L) 04/14/2024    CALCIUM 8.5 (L) 04/14/2024    PROTENTOTREF 6.9 04/27/2017    ALBUMIN 3.4 (L) 04/13/2024    LABIL2 1.7 04/27/2017     (H) 04/13/2024     (H) " 04/13/2024     Procalcitonin 1.62    Microbiology:  4/12 BCx Staph aureus (per BCID)  4/12 COVID/influenza PCR negative  4/12 UCx neg    Radiology:  4/12 CAT scan of the chest abdomen pelvis shows cardiomegaly, trace bilateral pleural effusions, right lung base pulmonary nodule which is nonspecific.  No lymphadenopathy.  No acute infiltrates.  Diffuse fatty infiltration of the liver gallbladder is absent.  Normal spleen and adrenals.  Normal kidneys no hydronephrosis.  Mild colonic diverticula without evidence of acute diverticulitis.  Appendix is absent.    Assessment & Plan   Staph aureus septicemia  V. tach/V-fib arrest  Coronary disease status post CABG  CKD 3 complicating above  Buttock/back/hip pain    Outside blood cultures with Staph aureus.  Start empiric daptomycin 8 mg/kg IV every 48 hours.  Discontinue empiric ceftriaxone.  Obtain blood cultures x 2.  Agree with imaging of the lower back/pelvic region to exclude infection.  Echocardiogram has been obtained today awaiting report to rule out endocarditis at this time.    I discussed the patient's findings and my recommendations with patient, family, and consulting provider

## 2024-04-14 NOTE — PLAN OF CARE
Goal Outcome Evaluation: pt getting transferred to [facility] for ICU with her cardiologist. Report called to [facility], packet ready and transport set for 2000. Lido patch to sternum for tenderness from CPR

## 2024-04-14 NOTE — PROGRESS NOTES
LOS: 1 day   Patient Care Team:  Rosalind Blake MD as PCP - General (Family Medicine)  Logan Lopez APRN as Nurse Practitioner (Nurse Practitioner)  Jose Brice APRN (Family Medicine)  Krzysztof Nicholas APRN (Nurse Practitioner)  Dayo Beck MD PhD as Consulting Physician (Ophthalmology)  Kanu Dewitt DPM as Consulting Physician (Podiatry)  Bryant Burnette MD (Hematology and Oncology)  Michael Cochran MD as Consulting Physician (Pulmonary Disease)  Kanu Bennett MD as Consulting Physician (Nephrology)    Subjective     Patient is tired did not sleep well she is having a lot of pain in her chest particularly with movement still has some of her buttock/back/hip pain.  Patient does have obstructive sleep apnea uses a Pap machine at home her family supposed to bring it in today.    Review of Systems:          Objective     Vital Signs  Vital Sign Min/Max for last 24 hours  Temp  Min: 97.5 °F (36.4 °C)  Max: 98.3 °F (36.8 °C)   BP  Min: 118/75  Max: 146/53   Pulse  Min: 60  Max: 66   Resp  Min: 20  Max: 20   SpO2  Min: 96 %  Max: 100 %   Flow (L/min)  Min: 2  Max: 2   Weight  Min: 91 kg (200 lb 9.9 oz)  Max: 91 kg (200 lb 9.9 oz)        Ventilator/Non-Invasive Ventilation Settings (From admission, onward)      None                         Body mass index is 33.38 kg/m².  I/O last 3 completed shifts:  In: 745 [P.O.:360; I.V.:385]  Out: 275 [Urine:275]  No intake/output data recorded.        Physical Exam:    General Appearance: Morbidly obese white female she is resting in bed she does not look in acute distress  Eyes: Conjunctiva are clear and anicteric pupils are equal and reactive to light  ENT: Mucous membranes are little dry no erythema no exudates she has a Mallampati type II airway, nasal septum midline  Neck: No lymphadenopathy or thyromegaly no jugular venous tension and trachea midline  Lungs: Clear nonlabored symmetric expansion no wheezes no rales no  rhonchi  Cardiac: Regular rate rhythm murmur loudest in the left upper sternal border  Abdomen: Soft nontender no palpable hepatosplenomegaly or masses  : Not examined  Musculoskeletal: Does not want to move much for pain she has some pain along her costochondral junction areas bilaterally multiple sites  Skin: No jaundice no petechiae skin is warm and dry  Neuro: She is alert and oriented she is cooperative following commands grossly intact  Extremities/P Vascular: No clubbing no cyanosis and no edema she has palpable radial pulses her dorsalis pedis pulses are present they are not as strong as the radial pulses.  MSE: She is pleasant and appropriate     Labs:  Results from last 7 days   Lab Units 04/14/24 0430 04/13/24 1827 04/13/24 0223 04/12/24  1949   GLUCOSE mg/dL 192* 222*  --   --    SODIUM mmol/L 137 137  --   --    POTASSIUM mmol/L 4.7 5.0  --   --    MAGNESIUM mg/dL  --  2.8* 2.6* 2.1   CO2 mmol/L 17.0* 20.0*  --   --    CHLORIDE mmol/L 105 101  --   --    ANION GAP mmol/L 15.0 16.0*  --   --    CREATININE mg/dL 3.18* 2.78*  --   --    BUN mg/dL 69* 66*  --   --    BUN / CREAT RATIO  21.7 23.7  --   --    CALCIUM mg/dL 8.5* 8.8  --   --    ALK PHOS U/L  --  135*  --   --    TOTAL PROTEIN g/dL  --  6.5  --   --    ALT (SGPT) U/L  --  164*  --   --    AST (SGOT) U/L  --  115*  --   --    BILIRUBIN mg/dL  --  0.4  --   --    ALBUMIN g/dL  --  3.4*  --   --    GLOBULIN gm/dL  --  3.1  --   --      Estimated Creatinine Clearance: 15.2 mL/min (A) (by C-G formula based on SCr of 3.18 mg/dL (H)).  Results from last 7 days   Lab Units 04/13/24 1827   IONIZED CALCIUM mg/dL 4.8     Results from last 7 days   Lab Units 04/14/24 0430 04/13/24 1827   WBC 10*3/mm3 14.34* 14.01*   RBC 10*6/mm3 3.21* 3.37*   HEMOGLOBIN g/dL 8.8* 9.2*   HEMATOCRIT % 27.2* 29.8*   MCV fL 84.7 88.4   MCH pg 27.4 27.3   MCHC g/dL 32.4 30.9*   RDW % 16.0* 16.2*   RDW-SD fl 49.8 53.1   MPV fL 10.5 10.5   PLATELETS 10*3/mm3 211 193        "  Results from last 7 days   Lab Units 04/13/24 2110 04/13/24 1827   HSTROP T ng/L 128* 134*         Results from last 7 days   Lab Units 04/13/24 1827 04/12/24 1949   TSH uIU/mL 4.140 1.881     Results from last 7 days   Lab Units 04/13/24 1827 04/13/24  0155   LACTATE mmol/L 1.7  --    PROCALCITONIN ng/mL 3.61* 1.62*     Results from last 7 days   Lab Units 04/13/24 1827 04/13/24  0223 04/12/24  1949   INR  1.82* 1.94* 1.68*     Microbiology Results (last 10 days)       Procedure Component Value - Date/Time    RSV PCR - , [[id number]]  (Normal) Collected: 04/12/24 2258    Lab Status: Final result Updated: 04/13/24 1511                apixaban, 2.5 mg, Oral, Q12H  aspirin, 81 mg, Oral, Daily  atorvastatin, 10 mg, Oral, Nightly  cefTRIAXone, 2,000 mg, Intravenous, Q24H  febuxostat, 40 mg, Oral, Q48H  ferrous sulfate, 325 mg, Oral, Daily With Breakfast  furosemide, 20 mg, Oral, Daily  insulin lispro, 2-9 Units, Subcutaneous, 4x Daily AC & at Bedtime  losartan, 50 mg, Oral, Q24H  metoprolol succinate XL, 50 mg, Oral, Q24H  senna-docusate sodium, 2 tablet, Oral, BID  sodium chloride, 10 mL, Intravenous, Q12H      amiodarone, 0.5 mg/min, Last Rate: 0.5 mg/min (04/13/24 2300)        Diagnostics:  XR chest AP portable    Result Date: 4/13/2024  PORTABLE CHEST HISTORY: Status post CODE BLUE, SIRS. COMPARISON: 4/12/2024. FINDINGS: A single portable radiograph of the chest was performed. The patient is status post sternotomy. There is a left pacemaker. There is cardiomegaly with calcified plaque of the aorta. There is pulmonary vascular congestion. Mild interstitial edema is not excluded. There are no significant effusions.    Cardiomegaly with vascular congestion. Interstitial edema is not excluded. Images reviewed, interpreted, dictated and electronically signed by Charan Marley MD Voice transcription technology (Power Scribe) is used for the dictation of this note and \"sound-alike\" words might be erroneously placed " despite reviewing this note for accuracy. Errors in dictation may reflect use of voice recognition software and not all errors in transcription may have been detected prior to signing.    CT Abdomen Pelvis With Contrast    Result Date: 4/12/2024  CT SCAN OF THE CHEST, ABDOMEN AND PELVIS WITH CONTRAST    4/12/2024 9:40 PM HISTORY: Sepsis. COMPARISON: Chest x-ray from the same day. PROCEDURE: The patient was injected with IV contrast. Oral contrast was administered. Axial images were obtained from the lung apex to the pubic symphysis by computed tomography. This study was performed with techniques to keep radiation doses as low as reasonably achievable, (ALARA). Individualized dose reduction techniques using automated exposure control or adjustment of mA and/or kV according to the patient size were employed. FINDINGS: CHEST: There are minimally increased interstitial markings seen diffusely. There are trace bilateral pleural effusions. There is an 11 mm pulmonary nodule in the right lung base, which is nonspecific. There is no axillary adenopathy. There is no hilar or mediastinal adenopathy. No pulmonary arterial filling defects are identified within the main and right and left pulmonary arteries. Contrast opacification in the interlobar and segmental branches is diminished, and small filling defects cannot be excluded. The heart size is enlarged. There is no pericardial effusion. No pneumothorax. No suspicious infiltrate or nodule identified. Left-sided cardiac pacer device is noted. ABDOMEN: The liver is diffusely hypoattenuating consistent with fatty infiltration. Gallbladder is surgically absent. The spleen and the pancreas are grossly unremarkable. Adrenal glands are unremarkable. Bilateral kidneys demonstrate benign-appearing cystic lesions. Mild renal cortical thinning. No hydronephrosis. No nephroureteral calculi are evident. Mild colonic diverticula without evidence of acute diverticulitis. The aorta is  normal in caliber. There is no free fluid or adenopathy. No dilated loops of bowel. No free intraperitoneal air. No significant stranding seen in the mesenteries. PELVIS: The GI tract demonstrates no obstruction. The appendix is surgically absent. The urinary bladder is unremarkable.  There is no free fluid, adenopathy, or inflammatory process. Patient appears to be status post hysterectomy. BONES: No acute fractures. Advanced degenerative changes are present throughout the visualized spine.    Cardiomegaly, with minimal interstitial changes. Trace bilateral pleural effusions. Findings may reflect a mild CHF exacerbation. An 11 mm pulmonary nodule in the right lung base. Follow-up chest CT with IV contrast in 3 months is recommended to document stability of this finding. Alternatively consider further characterization with tissue sampling, or PET/CT. No acute abnormality within the abdomen/pelvis.    CT Chest With Contrast Diagnostic    Result Date: 4/12/2024  CT SCAN OF THE CHEST, ABDOMEN AND PELVIS WITH CONTRAST    4/12/2024 9:40 PM HISTORY: Sepsis. COMPARISON: Chest x-ray from the same day. PROCEDURE: The patient was injected with IV contrast. Oral contrast was administered. Axial images were obtained from the lung apex to the pubic symphysis by computed tomography. This study was performed with techniques to keep radiation doses as low as reasonably achievable, (ALARA). Individualized dose reduction techniques using automated exposure control or adjustment of mA and/or kV according to the patient size were employed. FINDINGS: CHEST: There are minimally increased interstitial markings seen diffusely. There are trace bilateral pleural effusions. There is an 11 mm pulmonary nodule in the right lung base, which is nonspecific. There is no axillary adenopathy. There is no hilar or mediastinal adenopathy. No pulmonary arterial filling defects are identified within the main and right and left pulmonary arteries.  Contrast opacification in the interlobar and segmental branches is diminished, and small filling defects cannot be excluded. The heart size is enlarged. There is no pericardial effusion. No pneumothorax. No suspicious infiltrate or nodule identified. Left-sided cardiac pacer device is noted. ABDOMEN: The liver is diffusely hypoattenuating consistent with fatty infiltration. Gallbladder is surgically absent. The spleen and the pancreas are grossly unremarkable. Adrenal glands are unremarkable. Bilateral kidneys demonstrate benign-appearing cystic lesions. Mild renal cortical thinning. No hydronephrosis. No nephroureteral calculi are evident. Mild colonic diverticula without evidence of acute diverticulitis. The aorta is normal in caliber. There is no free fluid or adenopathy. No dilated loops of bowel. No free intraperitoneal air. No significant stranding seen in the mesenteries. PELVIS: The GI tract demonstrates no obstruction. The appendix is surgically absent. The urinary bladder is unremarkable.  There is no free fluid, adenopathy, or inflammatory process. Patient appears to be status post hysterectomy. BONES: No acute fractures. Advanced degenerative changes are present throughout the visualized spine.    Cardiomegaly, with minimal interstitial changes. Trace bilateral pleural effusions. Findings may reflect a mild CHF exacerbation. An 11 mm pulmonary nodule in the right lung base. Follow-up chest CT with IV contrast in 3 months is recommended to document stability of this finding. Alternatively consider further characterization with tissue sampling, or PET/CT. No acute abnormality within the abdomen/pelvis.    XR Chest 1 View    Result Date: 4/12/2024  PORTABLE CHEST      4/12/2024 7:42 PM HISTORY: Back pain. COMPARISON: August 2023. FINDINGS: Postsurgical changes of median sternotomy and CABG are again noted. Again noted is a left-sided pacer device. The heart is enlarged, and increased in size since prior  study. The hilar structures are prominent. There is a trace left pleural effusions and mild diffuse interstitial and airspace opacities in the mid and lower lung zones. There is no pneumothorax. The osseous structures are unremarkable.    Worsening cardiomegaly. Mild diffuse infiltrates may be secondary to CHF.    Duplex Carotid Ultrasound CAR    Result Date: 3/22/2024  This result has an attachment that is not available. Table formatting from the original result was not included. Carotid Duplex - CPT 12075 Technique: Extracranial cerebrovascular duplex evaluation was performed utilizing gray scale, color flow, and Doppler spectral analysis. Indication: Known Stenosis READING MD FINDINGS: Right   Left   PSV (cm/s) EDV (cm/s)  PSV (cm/s) EDV (cm/s) 100 15 CCA Proximal 72 15 99 15 CCA Mid 87 15 57 15 CCA Distal 76 18   Bulb/bifurcation   193 44 ICA Proximal 134 30 167 33 ICA MId 133 28 134 38 ICA Distal 80 23 128       Vertebral   Direction Antegrade  Direction Antegrade   Subclavian   Waveform Triphasic  Waveform Triphasic 1.95 ICA/CCA Ratio 1.54 Plaque Characterization: Right  Left None Common Carotid Artery Mild Mild External Carotid Artery Heterogeneous Heterogeneous Bulb Heterogeneous Heterogeneous Internal Carotid Artery Heterogeneous          50-69% stenosis at the right proximal internal carotid artery, moderate plaque. <50% stenosis at the left proximal internal carotid artery, mild plaque. Plaque at the proximal external carotid artery bilaterally, increased velocity on the left suggests stenosis. Normal antegrade flow in the vertebral artery bilaterally.      Compared to the previous study on 9/19/23, there is no significant change. The Society of Radiologists in Ultrasound (SRU) Consensus interpretive criteria was utilized in estimating percent of stenosis.     Doppler Ankle Brachial Index Single Level CAR    Result Date: 3/22/2024  This result has an attachment that is not available. Table  formatting from the original result was not included. Ankle Brachial Index - CPT 98141 Technique: Continuous wave Doppler evaluation of the lower extremity arteries was performed using Doppler, pressures, and waveforms. Indication: Peripheral Vascular Disease, left foot slow healing wound READING MD FINDINGS: Right YULIA Right TBI Left YULIA Left TBI 1.02 0.35 0.99 0.19 Biphasic Doppler flow at the right and left distal posterior tibial, monophasic at the dorsalis pedis arteries.                                                                                                                                                                                                                                                                                                                                                                                                                                                                                                                                                                                                                                                                               Digit pressures and waveforms suggest abnormal arterial flow to the small vessels bilaterally.                                     1. Normal arterial perfusion in both legs with an YULIA of 1.02 on the right and 0.99 on the left. 2. Abnormal arterial flow to the great toe bilaterally. Compared to the previous study on 9/19/23, there is no significant change.             Active Hospital Problems    Diagnosis  POA    **Cardiac arrest [I46.9]  Yes      Resolved Hospital Problems   No resolved problems to display.         Assessment & Plan     V. tach/V-fib arrest resuscitated she had to shocks in her CPR she is on amiodarone cardiology recommends changing over to p.o. 200 mg twice daily and maintaining potassium and magnesium.  They are evaluating for possible upgrade of pacemaker to AICD  Hip/pelvic/back  pain this is really extremely low back is actually below her back even below her sacrum it seems to be at about the level of her femoral heads but is bilateral, it is not acute it has been present for over a year it is just become more acutely worse.  Unfortunately her pacer leads are not MRI compatible.  I will get a CT scan pelvis/hip looking for possible cause.  Chest pain this seems to be musculoskeletal most of it seems to be at the costochondral junctions probably related to CPR and some discs abruption of those joints.  She has a little bit more pain laterally on the left she could have a little fracture there as well.  I will try Lidoderm patch  Coronary artery disease with remote coronary bypass grafting  Cardiomyopathy with reduced left ventricular ejection fraction  Valvular heart disease with moderate to severe mitral regurgitation and mild aortic stenosis  Sick sinus syndrome with permanent pacemaker  Pulmonary hypertension by prior echocardiogram severe  Diabetes mellitus blood sugars high but slowly getting better looks like we can put on some scheduled insulin.  Elevated troponin mildly elevated when she came in she had a significant bump it looks like after her arrest and cardioversion/CPR which is not unexpected.  Question need for ischemic workup with her arrest.  Cardiology feels this is a type II non-ST elevation MI  Chronic kidney disease stage IIIb creatinine is up a little bit we will  have to follow this closely I am moving to go ahead and have her nephrologist follow-up.  Question should we hold her losartan I am going to go ahead and if nephrology thinks we should continue they can continue.  Pulmonary nodule right lower lobe 11 mm this will need to be followed now is not the time to address, may be a PET scan when she is out of the hospital.  Question infection patient had a elevated procalcitonin on admission and a fever and leukocytosis she has been on antibiotics cultures are pending  no definite source but I guess we need to continue antibiotics for now.  I wonder if she could have an infection in her hip or back that is contributing to the pain just an awful long time for over a year to go by.  I will ask infectious disease to see her   Hypertension continue home medications  Hyperlipidemia continue home statin therapy wise  Obstructive sleep apnea patient family to bring in home machine today    Plan for disposition:    Rikki Thayer Jr, MD  04/14/24  07:58 EDT    Time: Critical care time 34 minutes

## 2024-04-15 ENCOUNTER — TRANSITIONAL CARE MANAGEMENT TELEPHONE ENCOUNTER (OUTPATIENT)
Dept: CALL CENTER | Facility: HOSPITAL | Age: 84
End: 2024-04-15
Payer: MEDICARE

## 2024-04-15 LAB
AORTIC DIMENSIONLESS INDEX: 0.4 (DI)
ASCENDING AORTA: 3.5 CM
BH CV ECHO MEAS - ACS: 1.07 CM
BH CV ECHO MEAS - AO MAX PG: 19.9 MMHG
BH CV ECHO MEAS - AO MEAN PG: 11.6 MMHG
BH CV ECHO MEAS - AO ROOT DIAM: 3.1 CM
BH CV ECHO MEAS - AO V2 MAX: 223.3 CM/SEC
BH CV ECHO MEAS - AO V2 VTI: 53 CM
BH CV ECHO MEAS - AVA(I,D): 1.26 CM2
BH CV ECHO MEAS - EDV(CUBED): 223.9 ML
BH CV ECHO MEAS - EDV(MOD-SP2): 241 ML
BH CV ECHO MEAS - EDV(MOD-SP4): 167 ML
BH CV ECHO MEAS - EF(MOD-BP): 42.3 %
BH CV ECHO MEAS - EF(MOD-SP2): 45.6 %
BH CV ECHO MEAS - EF(MOD-SP4): 41.9 %
BH CV ECHO MEAS - ESV(CUBED): 108.5 ML
BH CV ECHO MEAS - ESV(MOD-SP2): 131 ML
BH CV ECHO MEAS - ESV(MOD-SP4): 97 ML
BH CV ECHO MEAS - FS: 21.5 %
BH CV ECHO MEAS - IVS/LVPW: 0.8 CM
BH CV ECHO MEAS - IVSD: 0.92 CM
BH CV ECHO MEAS - LAT PEAK E' VEL: 4.8 CM/SEC
BH CV ECHO MEAS - LV DIASTOLIC VOL/BSA (35-75): 84.3 CM2
BH CV ECHO MEAS - LV MASS(C)D: 264.7 GRAMS
BH CV ECHO MEAS - LV MAX PG: 3.1 MMHG
BH CV ECHO MEAS - LV MEAN PG: 1.58 MMHG
BH CV ECHO MEAS - LV SYSTOLIC VOL/BSA (12-30): 49 CM2
BH CV ECHO MEAS - LV V1 MAX: 87.8 CM/SEC
BH CV ECHO MEAS - LV V1 VTI: 20 CM
BH CV ECHO MEAS - LVIDD: 6.1 CM
BH CV ECHO MEAS - LVIDS: 4.8 CM
BH CV ECHO MEAS - LVOT AREA: 3.3 CM2
BH CV ECHO MEAS - LVOT DIAM: 2.06 CM
BH CV ECHO MEAS - LVPWD: 1.15 CM
BH CV ECHO MEAS - MED PEAK E' VEL: 4 CM/SEC
BH CV ECHO MEAS - MV A DUR: 0.13 SEC
BH CV ECHO MEAS - MV A MAX VEL: 73.2 CM/SEC
BH CV ECHO MEAS - MV DEC SLOPE: 635.6 CM/SEC2
BH CV ECHO MEAS - MV DEC TIME: 0.2 SEC
BH CV ECHO MEAS - MV E MAX VEL: 126 CM/SEC
BH CV ECHO MEAS - MV E/A: 1.72
BH CV ECHO MEAS - MV MAX PG: 7.8 MMHG
BH CV ECHO MEAS - MV MEAN PG: 3.1 MMHG
BH CV ECHO MEAS - MV P1/2T: 69.7 MSEC
BH CV ECHO MEAS - MV V2 VTI: 44.8 CM
BH CV ECHO MEAS - MVA(P1/2T): 3.2 CM2
BH CV ECHO MEAS - MVA(VTI): 1.49 CM2
BH CV ECHO MEAS - PA ACC TIME: 0.06 SEC
BH CV ECHO MEAS - PA V2 MAX: 129.8 CM/SEC
BH CV ECHO MEAS - QP/QS: 1.39
BH CV ECHO MEAS - RAP SYSTOLE: 15 MMHG
BH CV ECHO MEAS - RV MAX PG: 1.56 MMHG
BH CV ECHO MEAS - RV V1 MAX: 62.4 CM/SEC
BH CV ECHO MEAS - RV V1 VTI: 13.9 CM
BH CV ECHO MEAS - RVOT DIAM: 2.9 CM
BH CV ECHO MEAS - RVSP: 75.8 MMHG
BH CV ECHO MEAS - SI(MOD-SP2): 55.6 ML/M2
BH CV ECHO MEAS - SI(MOD-SP4): 35.4 ML/M2
BH CV ECHO MEAS - SUP REN AO DIAM: 1.8 CM
BH CV ECHO MEAS - SV(LVOT): 66.9 ML
BH CV ECHO MEAS - SV(MOD-SP2): 110 ML
BH CV ECHO MEAS - SV(MOD-SP4): 70 ML
BH CV ECHO MEAS - SV(RVOT): 92.8 ML
BH CV ECHO MEAS - TAPSE (>1.6): 2.14 CM
BH CV ECHO MEAS - TR MAX PG: 60.8 MMHG
BH CV ECHO MEAS - TR MAX VEL: 389.8 CM/SEC
BH CV ECHO MEASUREMENTS AVERAGE E/E' RATIO: 28.64
BH CV XLRA - RV BASE: 3.9 CM
BH CV XLRA - RV LENGTH: 8.6 CM
BH CV XLRA - RV MID: 3.4 CM
BH CV XLRA - TDI S': 9.7 CM/SEC
GLUCOSE BLDC GLUCOMTR-MCNC: 247 MG/DL (ref 70–130)
LEFT ATRIUM VOLUME INDEX: 24.1 ML/M2
MAXIMAL PREDICTED HEART RATE: 138
SINUS: 2.6 CM
STJ: 2.24 CM
STRESS TARGET HR: 117

## 2024-04-15 NOTE — OUTREACH NOTE
Call Center TCM Note      Flowsheet Row Responses   [facility] patient discharged from? Non-  [Pt discharged from [facility] to [facility] and then transferred to [facility] (pt is LTC at [place])]   Does the patient have one of the following disease processes/diagnoses(primary or secondary)? Other   TCM attempt successful? No   Unsuccessful attempts Attempt 1            Tracy Brasher RN    4/15/2024, 15:14 EDT

## 2024-04-25 LAB
BACTERIA SPEC AEROBE CULT: NORMAL
BACTERIA SPEC AEROBE CULT: NORMAL